# Patient Record
Sex: MALE | Race: WHITE | Employment: OTHER | URBAN - METROPOLITAN AREA
[De-identification: names, ages, dates, MRNs, and addresses within clinical notes are randomized per-mention and may not be internally consistent; named-entity substitution may affect disease eponyms.]

---

## 2018-12-18 ENCOUNTER — TELEPHONE (OUTPATIENT)
Dept: CARDIOLOGY CLINIC | Age: 68
End: 2018-12-18

## 2018-12-18 NOTE — TELEPHONE ENCOUNTER
Faxed records request to Dr. Lorena Singh office.     Future Appointments   Date Time Provider Maggy Kelly   12/20/2018  1:40 PM Lennox Arboleda  [address]

## 2018-12-20 ENCOUNTER — OFFICE VISIT (OUTPATIENT)
Dept: CARDIOLOGY CLINIC | Age: 68
End: 2018-12-20

## 2018-12-20 VITALS
OXYGEN SATURATION: 98 % | HEIGHT: 72 IN | RESPIRATION RATE: 16 BRPM | BODY MASS INDEX: 32.23 KG/M2 | HEART RATE: 79 BPM | DIASTOLIC BLOOD PRESSURE: 80 MMHG | SYSTOLIC BLOOD PRESSURE: 120 MMHG | WEIGHT: 238 LBS

## 2018-12-20 DIAGNOSIS — I10 ESSENTIAL HYPERTENSION: ICD-10-CM

## 2018-12-20 DIAGNOSIS — E78.2 MIXED HYPERLIPIDEMIA: ICD-10-CM

## 2018-12-20 DIAGNOSIS — R06.02 SOB (SHORTNESS OF BREATH): ICD-10-CM

## 2018-12-20 DIAGNOSIS — I25.10 CORONARY ARTERY DISEASE INVOLVING NATIVE CORONARY ARTERY OF NATIVE HEART WITHOUT ANGINA PECTORIS: Primary | ICD-10-CM

## 2018-12-20 PROBLEM — R94.31 ABNORMAL EKG: Status: ACTIVE | Noted: 2018-12-20

## 2018-12-20 RX ORDER — LISINOPRIL 10 MG/1
10 TABLET ORAL DAILY
COMMUNITY
Start: 2018-09-26 | End: 2019-01-10

## 2018-12-20 RX ORDER — ATORVASTATIN CALCIUM 20 MG/1
40 TABLET, FILM COATED ORAL
COMMUNITY
Start: 2018-12-19

## 2018-12-20 RX ORDER — METOPROLOL TARTRATE 25 MG/1
25 TABLET, FILM COATED ORAL 2 TIMES DAILY
COMMUNITY
Start: 2018-11-25 | End: 2019-01-25

## 2018-12-20 RX ORDER — PIOGLITAZONEHYDROCHLORIDE 30 MG/1
30 TABLET ORAL DAILY
COMMUNITY
End: 2019-12-09

## 2018-12-20 RX ORDER — GLIPIZIDE 10 MG/1
10 TABLET ORAL 2 TIMES DAILY
COMMUNITY
Start: 2018-11-26

## 2018-12-20 RX ORDER — METFORMIN HYDROCHLORIDE 500 MG/1
1000 TABLET ORAL 2 TIMES DAILY WITH MEALS
COMMUNITY
Start: 2018-12-17

## 2018-12-20 NOTE — PROGRESS NOTES
HISTORY OF PRESENT ILLNESS  Stewart Armstrong is a 79 y.o. male     SUMMARY:   Problem List  Date Reviewed: 12/20/2018          Codes Class Noted    Coronary artery disease involving native coronary artery without angina pectoris ICD-10-CM: I25.10  ICD-9-CM: 414.01  12/20/2018    Overview Signed 12/20/2018  4:32 PM by Solomon Schilder, MD     2010 mcv, small heart attack with 4 stents to rca, lad  2013 mcv, abnormal stress test, stent to diagonal             Abnormal EKG ICD-10-CM: R94.31  ICD-9-CM: 794.31  12/20/2018        Essential hypertension ICD-10-CM: I10  ICD-9-CM: 401.9  12/20/2018        Mixed hyperlipidemia ICD-10-CM: E78.2  ICD-9-CM: 272.2  12/20/2018              Current Outpatient Medications on File Prior to Visit   Medication Sig    atorvastatin (LIPITOR) 20 mg tablet 20 mg daily.  glipiZIDE (GLUCOTROL) 10 mg tablet Take 10 mg by mouth two (2) times a day.  lisinopril (PRINIVIL, ZESTRIL) 10 mg tablet Take 10 mg by mouth daily.  metFORMIN (GLUCOPHAGE) 500 mg tablet Take 1,000 mg by mouth two (2) times daily (with meals).  metoprolol tartrate (LOPRESSOR) 25 mg tablet Take 25 mg by mouth two (2) times a day.  pioglitazone (ACTOS) 30 mg tablet Take 30 mg by mouth daily. No current facility-administered medications on file prior to visit. CARDIOLOGY STUDIES TO DATE:  unknown  Chief Complaint   Patient presents with    Cholesterol Problem     HPI :  Mr. Edvin Gordon is a 79year-old with known coronary artery disease, who is self-referred for ongoing cardiac care. He used to see Dr. Brayan Mcgarry at [facility]. He had a small heart attack in 2010 with four stents and then an abnormal stress test in 2013 with another stent. He has longstanding hypertension and type 2 diabetes. He quit smoking in 2010. He has hyperlipidemia and his father had a fatal MI at age 64. We got some notes from Dr. Kellen Crocker' office, which I have reviewed and summarized in his chart. His recent A1C was 7.2.   HDL was 28 and the LDL was 69. He is active, but gets no regular exercise and has noticed some dyspnea on exertion over the last four to six months. His EKG today shows sinus rhythm with an intraventricular conduction delay and left ventricular hypertrophy by volts with left axis deviation. CARDIAC ROS:   negative for chest pain, palpitations, syncope, orthopnea, paroxysmal nocturnal dyspnea, exertional chest pressure/discomfort, claudication, lower extremity edema    Family History   Problem Relation Age of Onset    Heart Attack Father         fatal, age 64       Past Medical History:   Diagnosis Date    Type 2 diabetes mellitus ([facility] [address]        GENERAL ROS:  A comprehensive review of systems was negative except for that written in the HPI. Visit Vitals  /80 (BP 1 Location: Left arm, BP Patient Position: Sitting)   Pulse 79   Resp 16   Ht 6' (1.829 m)   Wt 238 lb (108 kg)   SpO2 98%   BMI 32.28 kg/m²       Wt Readings from Last 3 Encounters:   12/20/18 238 lb (108 kg)            BP Readings from Last 3 Encounters:   12/20/18 120/80       PHYSICAL EXAM  General appearance: alert, cooperative, no distress, appears stated age  Neurologic: Alert and oriented X 3  Neck: supple, symmetrical, trachea midline, no adenopathy, no carotid bruit and no JVD  Lungs: clear to auscultation bilaterally  Heart: regular rate and rhythm, S1, S2 normal, no murmur, click, rub or gallop  Abdomen: soft, non-tender.  Bowel sounds normal. No masses,  no organomegaly  Extremities: extremities normal, atraumatic, no cyanosis or edema  Pulses: 2+ and symmetric      ASSESSMENT  Mr. Krystle Quiros has multiple ongoing risk factors and some relatively recent symptoms, which could just be from deconditioning, but given his history, I think he needs a stress echocardiogram and he will hold his beta-blocker the night before and the morning of that exam.  We will obtain his old records from [facility] and we talked about the symptoms that would prompt an urgent return visit here or a call to 911. current treatment plan is effective, no change in therapy  lab results and schedule of future lab studies reviewed with patient  reviewed diet, exercise and weight control    Encounter Diagnoses   Name Primary?  Coronary artery disease involving native coronary artery of native heart without angina pectoris Yes    Essential hypertension     Mixed hyperlipidemia     SOB (shortness of breath)      Orders Placed This Encounter    AMB POC EKG ROUTINE W/ 12 LEADS, INTER & REP    ECHO TTE STRESS COMP W OR WO CONTR    atorvastatin (LIPITOR) 20 mg tablet    glipiZIDE (GLUCOTROL) 10 mg tablet    lisinopril (PRINIVIL, ZESTRIL) 10 mg tablet    metFORMIN (GLUCOPHAGE) 500 mg tablet    metoprolol tartrate (LOPRESSOR) 25 mg tablet    pioglitazone (ACTOS) 30 mg tablet       Follow-up Disposition:  Return in about 3 months (around 3/20/2019).     Rosa M Street MD  12/20/2018

## 2019-01-09 ENCOUNTER — CLINICAL SUPPORT (OUTPATIENT)
Dept: CARDIOLOGY CLINIC | Age: 69
End: 2019-01-09

## 2019-01-09 DIAGNOSIS — I10 ESSENTIAL HYPERTENSION: ICD-10-CM

## 2019-01-09 DIAGNOSIS — E78.2 MIXED HYPERLIPIDEMIA: ICD-10-CM

## 2019-01-09 DIAGNOSIS — I25.10 CORONARY ARTERY DISEASE INVOLVING NATIVE CORONARY ARTERY, ANGINA PRESENCE UNSPECIFIED, UNSPECIFIED WHETHER NATIVE OR TRANSPLANTED HEART: Primary | ICD-10-CM

## 2019-01-09 DIAGNOSIS — R94.31 ABNORMAL EKG: ICD-10-CM

## 2019-01-09 DIAGNOSIS — R06.02 SOB (SHORTNESS OF BREATH): ICD-10-CM

## 2019-01-09 DIAGNOSIS — I25.10 CORONARY ARTERY DISEASE INVOLVING NATIVE CORONARY ARTERY OF NATIVE HEART WITHOUT ANGINA PECTORIS: Primary | ICD-10-CM

## 2019-01-09 NOTE — PROGRESS NOTES
Pt came in for stress echo per Dr Brian De La Garza. Resting b/p by me 200/110. Repeat b/p by Abimbola Polanco /106. Dr. Brian De La Garza notified. Per Dr. Brian De La Garza stress echo cancelled and complete echo ordered. Per Dr. Brian De La Garza pt will resume his meds and come back for [facility] cardiolite.

## 2019-01-10 ENCOUNTER — TELEPHONE (OUTPATIENT)
Dept: CARDIOLOGY CLINIC | Age: 69
End: 2019-01-10

## 2019-01-10 DIAGNOSIS — I10 ESSENTIAL HYPERTENSION: Primary | ICD-10-CM

## 2019-01-10 RX ORDER — LISINOPRIL 20 MG/1
20 TABLET ORAL DAILY
Qty: 30 TAB | Refills: 0
Start: 2019-01-10 | End: 2019-01-25

## 2019-01-10 NOTE — TELEPHONE ENCOUNTER
----- Message from Franklyn Garsia MD sent at 1/10/2019  8:10 AM EST -----  Heart muscle is a little weak and shows evidence of prior heart attacks. Heart valves all ok. No surprise, this is what I would have expected.

## 2019-01-11 ENCOUNTER — TELEPHONE (OUTPATIENT)
Dept: CARDIOLOGY CLINIC | Age: 69
End: 2019-01-11

## 2019-01-11 NOTE — TELEPHONE ENCOUNTER
Called patient. Verified patient's identity with two identifiers. Patient stated his BP has been high still.  the past 2 days. This morning it was 180 before medications then a few hours after taking lisinopril 20 mg and lopressor 25 mg SBP down to 150. Patient denied having had any sxs. Patient will continue medications and will start keeping log of BPs. He will call the office if SBP > 170 on 2 or more occasions. Advised watching salt intake. Patient also requested f/u appointment with Dr. Erin Estrada to discuss stress test results and BP. Scheduled appointment for January 25, which is 2 days after 2nd day of stress test so it should have been read. Discussed signs and symptoms qualifying urgent care or call to office. Patient verbalized understanding and denied further questions or concerns. Notified Dr. Erin Estrada of all of this and he agreed. Called patient's daughter, Soniya Block, back and notified her Dr. Erin Estrada agreed to all of the above. She will notify patient.

## 2019-01-22 ENCOUNTER — CLINICAL SUPPORT (OUTPATIENT)
Dept: CARDIOLOGY CLINIC | Age: 69
End: 2019-01-22

## 2019-01-22 DIAGNOSIS — R06.02 SHORTNESS OF BREATH: ICD-10-CM

## 2019-01-22 DIAGNOSIS — R94.31 ABNORMAL EKG: ICD-10-CM

## 2019-01-22 DIAGNOSIS — E78.2 MIXED HYPERLIPIDEMIA: ICD-10-CM

## 2019-01-22 DIAGNOSIS — I25.10 CORONARY ARTERY DISEASE INVOLVING NATIVE CORONARY ARTERY OF NATIVE HEART WITHOUT ANGINA PECTORIS: Primary | ICD-10-CM

## 2019-01-22 DIAGNOSIS — I10 ESSENTIAL HYPERTENSION: ICD-10-CM

## 2019-01-22 DIAGNOSIS — Z98.61 HISTORY OF PTCA: ICD-10-CM

## 2019-01-22 NOTE — PROGRESS NOTES
See scanned document. Ordering Doctor:Dr. Chele Martinez  Reading Doctor:Dr. Caryle Chol    Patient tolerated procedure well.

## 2019-01-23 ENCOUNTER — CLINICAL SUPPORT (OUTPATIENT)
Dept: CARDIOLOGY CLINIC | Age: 69
End: 2019-01-23

## 2019-01-23 DIAGNOSIS — I25.10 CORONARY ARTERY DISEASE INVOLVING NATIVE CORONARY ARTERY OF NATIVE HEART WITHOUT ANGINA PECTORIS: Primary | ICD-10-CM

## 2019-01-23 DIAGNOSIS — E78.2 MIXED HYPERLIPIDEMIA: ICD-10-CM

## 2019-01-23 DIAGNOSIS — R94.31 ABNORMAL EKG: ICD-10-CM

## 2019-01-23 DIAGNOSIS — I10 ESSENTIAL HYPERTENSION: ICD-10-CM

## 2019-01-25 ENCOUNTER — OFFICE VISIT (OUTPATIENT)
Dept: CARDIOLOGY CLINIC | Age: 69
End: 2019-01-25

## 2019-01-25 VITALS
BODY MASS INDEX: 32.78 KG/M2 | SYSTOLIC BLOOD PRESSURE: 162 MMHG | HEIGHT: 72 IN | HEART RATE: 71 BPM | OXYGEN SATURATION: 98 % | DIASTOLIC BLOOD PRESSURE: 92 MMHG | WEIGHT: 242 LBS

## 2019-01-25 DIAGNOSIS — R94.31 ABNORMAL EKG: ICD-10-CM

## 2019-01-25 DIAGNOSIS — I10 ESSENTIAL HYPERTENSION: ICD-10-CM

## 2019-01-25 DIAGNOSIS — E78.2 MIXED HYPERLIPIDEMIA: ICD-10-CM

## 2019-01-25 DIAGNOSIS — I25.10 CORONARY ARTERY DISEASE INVOLVING NATIVE CORONARY ARTERY OF NATIVE HEART WITHOUT ANGINA PECTORIS: Primary | ICD-10-CM

## 2019-01-25 DIAGNOSIS — I25.5 ISCHEMIC CARDIOMYOPATHY: ICD-10-CM

## 2019-01-25 RX ORDER — LISINOPRIL 40 MG/1
40 TABLET ORAL DAILY
Qty: 30 TAB | Refills: 5 | Status: SHIPPED | OUTPATIENT
Start: 2019-01-25 | End: 2019-05-24 | Stop reason: SDUPTHER

## 2019-01-25 RX ORDER — GUAIFENESIN 100 MG/5ML
81 LIQUID (ML) ORAL DAILY
COMMUNITY
End: 2019-03-27

## 2019-01-25 RX ORDER — METOPROLOL SUCCINATE 50 MG/1
50 TABLET, EXTENDED RELEASE ORAL DAILY
Qty: 30 TAB | Refills: 5 | Status: SHIPPED | OUTPATIENT
Start: 2019-01-25 | End: 2019-08-27 | Stop reason: SDUPTHER

## 2019-01-25 NOTE — PROGRESS NOTES
HISTORY OF PRESENT ILLNESS  Preston Jesus is a 79 y.o. male     SUMMARY:   Problem List  Date Reviewed: 1/25/2019          Codes Class Noted    Coronary artery disease involving native coronary artery without angina pectoris ICD-10-CM: I25.10  ICD-9-CM: 414.01  12/20/2018    Overview Signed 12/20/2018  4:32 PM by Norberto Evans MD     2010 mcv, small heart attack with 4 stents to rca, lad  2013 mcv, abnormal stress test, stent to diagonal             Abnormal EKG ICD-10-CM: R94.31  ICD-9-CM: 794.31  12/20/2018        Essential hypertension ICD-10-CM: I10  ICD-9-CM: 401.9  12/20/2018        Mixed hyperlipidemia ICD-10-CM: E78.2  ICD-9-CM: 272.2  12/20/2018              Current Outpatient Medications on File Prior to Visit   Medication Sig    aspirin 81 mg chewable tablet Take 81 mg by mouth daily.  omega 3-dha-epa-fish oil (FISH OIL) 100-160-1,000 mg cap Take  by mouth.  lisinopril (PRINIVIL, ZESTRIL) 20 mg tablet Take 1 Tab by mouth daily. 1/10/19- increased from 10 mg to 20 mg per Dr. Ana Duffy verbal order    atorvastatin (LIPITOR) 20 mg tablet 20 mg daily.  glipiZIDE (GLUCOTROL) 10 mg tablet Take 10 mg by mouth two (2) times a day.  metFORMIN (GLUCOPHAGE) 500 mg tablet Take 1,000 mg by mouth two (2) times daily (with meals).  metoprolol tartrate (LOPRESSOR) 25 mg tablet Take 25 mg by mouth two (2) times a day.  pioglitazone (ACTOS) 30 mg tablet Take 30 mg by mouth daily. No current facility-administered medications on file prior to visit.         CARDIOLOGY STUDIES TO DATE:  2010 mcv, small heart attack with 4 stents to rca, lad  2013 mcv, abnormal stress test, stent to diagonal  1/19 echo lvef 43%  1/19 lexiscan cardiolyte with large fixed inferolateral defect, lvef 44%    Chief Complaint   Patient presents with    Coronary Artery Disease     HPI :  Mr. Serina Rai blood pressure is better, but still not ideal.  He remains very active with no symptoms suggestive of angina and we reviewed his recent reports and test results, which are consistent with mildly reduced ejection fraction and an old inferior MI. He still has dyspnea on exertion. CARDIAC ROS:   negative for chest pain, palpitations, syncope, orthopnea, paroxysmal nocturnal dyspnea, exertional chest pressure/discomfort, claudication, lower extremity edema    Family History   Problem Relation Age of Onset    Heart Attack Father         fatal, age 64       Past Medical History:   Diagnosis Date    Type 2 diabetes mellitus ([facility] [address]        GENERAL ROS:  A comprehensive review of systems was negative except for that written in the HPI. Visit Vitals  BP (!) 162/92   Pulse 71   Ht 6' (1.829 m)   Wt 242 lb (109.8 kg)   SpO2 98%   BMI 32.82 kg/m²       Wt Readings from Last 3 Encounters:   01/25/19 242 lb (109.8 kg)   12/20/18 238 lb (108 kg)            BP Readings from Last 3 Encounters:   01/25/19 (!) 162/92   12/20/18 120/80       PHYSICAL EXAM  General appearance: alert, cooperative, no distress, appears stated age  Neurologic: Alert and oriented X 3  Neck: supple, symmetrical, trachea midline, no adenopathy, no carotid bruit and no JVD  Lungs: clear to auscultation bilaterally  Heart: regular rate and rhythm, S1, S2 normal, no murmur, click, rub or gallop  Extremities: extremities normal, atraumatic, no cyanosis or edema      ASSESSMENT  Mr. Maryann Garay is stable and I think asymptomatic with fairly reassuring test results. I do think he needs better blood pressure control and perhaps that is part of his issues in terms of shortness of breath, so we are going to increase his Lisinopril to 40 mg a day and change the Metoprolol to Toprol XL 50 mg a day. If this is ineffective, we are either going to add a diuretic or a calcium channel blocker like Amlodipine.            current treatment plan is effective, no change in therapy  lab results and schedule of future lab studies reviewed with patient  reviewed diet, exercise and weight control    Encounter Diagnoses   Name Primary?  Coronary artery disease involving native coronary artery of native heart without angina pectoris Yes    Abnormal EKG     Essential hypertension     Mixed hyperlipidemia     Ischemic cardiomyopathy      Orders Placed This Encounter    aspirin 81 mg chewable tablet    omega 3-dha-epa-fish oil (FISH OIL) 100-160-1,000 mg cap       Follow-up Disposition:  Return in about 4 weeks (around 2/22/2019).     Bria Kebede MD  1/25/2019

## 2019-01-25 NOTE — PATIENT INSTRUCTIONS
Stop your current metoprolol (2 times a day) and replace with Toprol XL 50 mg daily. Increase your lisinopril from 20 mg to 40 mg daily.

## 2019-02-25 ENCOUNTER — OFFICE VISIT (OUTPATIENT)
Dept: CARDIOLOGY CLINIC | Age: 69
End: 2019-02-25

## 2019-02-25 VITALS
DIASTOLIC BLOOD PRESSURE: 80 MMHG | SYSTOLIC BLOOD PRESSURE: 124 MMHG | HEART RATE: 62 BPM | WEIGHT: 240.4 LBS | RESPIRATION RATE: 19 BRPM | OXYGEN SATURATION: 97 % | BODY MASS INDEX: 32.56 KG/M2 | HEIGHT: 72 IN

## 2019-02-25 DIAGNOSIS — I25.10 CORONARY ARTERY DISEASE INVOLVING NATIVE CORONARY ARTERY OF NATIVE HEART WITHOUT ANGINA PECTORIS: Primary | ICD-10-CM

## 2019-02-25 DIAGNOSIS — I10 ESSENTIAL HYPERTENSION: ICD-10-CM

## 2019-02-25 DIAGNOSIS — E78.2 MIXED HYPERLIPIDEMIA: ICD-10-CM

## 2019-02-25 NOTE — PROGRESS NOTES
HISTORY OF PRESENT ILLNESS Pawan Logan is a 79 y.o. male SUMMARY:  
Problem List  Date Reviewed: 2/25/2019 Codes Class Noted Coronary artery disease involving native coronary artery without angina pectoris ICD-10-CM: I25.10 ICD-9-CM: 414.01  12/20/2018 Overview Signed 12/20/2018  4:32 PM by Connie Garnett MD  
  2010 mcv, small heart attack with 4 stents to rca, lad 2013 mcv, abnormal stress test, stent to diagonal 
  
  
   
 Abnormal EKG ICD-10-CM: R94.31 
ICD-9-CM: 794.31  12/20/2018 Essential hypertension ICD-10-CM: I10 
ICD-9-CM: 401.9  12/20/2018 Mixed hyperlipidemia ICD-10-CM: E78.2 ICD-9-CM: 272.2  12/20/2018 Current Outpatient Medications on File Prior to Visit Medication Sig  
 aspirin 81 mg chewable tablet Take 81 mg by mouth daily.  omega 3-dha-epa-fish oil (FISH OIL) 100-160-1,000 mg cap Take  by mouth.  lisinopril (PRINIVIL, ZESTRIL) 40 mg tablet Take 1 Tab by mouth daily.  metoprolol succinate (TOPROL-XL) 50 mg XL tablet Take 1 Tab by mouth daily.  atorvastatin (LIPITOR) 20 mg tablet 20 mg daily.  glipiZIDE (GLUCOTROL) 10 mg tablet Take 10 mg by mouth two (2) times a day.  metFORMIN (GLUCOPHAGE) 500 mg tablet Take 1,000 mg by mouth two (2) times daily (with meals).  pioglitazone (ACTOS) 30 mg tablet Take 30 mg by mouth daily. No current facility-administered medications on file prior to visit. CARDIOLOGY STUDIES TO DATE: 
2010 mcv, small heart attack with 4 stents to rca, lad 2013 mcv, abnormal stress test, stent to diagonal 
1/19 echo lvef 43% 1/19 lexiscan cardiolyte with large fixed inferolateral defect, lvef 44% Chief Complaint Patient presents with  Coronary Artery Disease HPI : 
Mr. Ernie Michel is doing great.   Blood pressure has come under good control and he still remains very active around his house and home with no worrisome symptoms. Dr. Case Rodas is following his lipids and his diabetes. CARDIAC ROS:  
negative for chest pain, dyspnea, palpitations, syncope, orthopnea, paroxysmal nocturnal dyspnea, exertional chest pressure/discomfort, claudication, lower extremity edema Family History Problem Relation Age of Onset  Heart Attack Father   
     fatal, age 64 Past Medical History:  
Diagnosis Date  Type 2 diabetes mellitus ([facility] [address] GENERAL ROS: 
A comprehensive review of systems was negative except for that written in the HPI. Visit Vitals /80 (BP 1 Location: Left arm, BP Patient Position: Sitting) Pulse 62 Resp 19 Ht 6' (1.829 m) Wt 240 lb 6.4 oz (109 kg) SpO2 97% BMI 32.60 kg/m² Wt Readings from Last 3 Encounters:  
02/25/19 240 lb 6.4 oz (109 kg) 01/25/19 242 lb (109.8 kg) 12/20/18 238 lb (108 kg) BP Readings from Last 3 Encounters:  
02/25/19 124/80  
01/25/19 (!) 162/92  
12/20/18 120/80 PHYSICAL EXAM 
General appearance: alert, cooperative, no distress, appears stated age Neurologic: Alert and oriented X 3 Neck: supple, symmetrical, trachea midline, no adenopathy, no carotid bruit and no JVD Lungs: clear to auscultation bilaterally Heart: regular rate and rhythm, S1, S2 normal, no murmur, click, rub or gallop Extremities: extremities normal, atraumatic, no cyanosis or edema ASSESSMENT Mr. Zeinab Joseph is stable and asymptomatic and well compensated on a good medical regimen and needs no cardiac testing at this time. current treatment plan is effective, no change in therapy 
lab results and schedule of future lab studies reviewed with patient 
reviewed diet, exercise and weight control Encounter Diagnoses Name Primary?  Coronary artery disease involving native coronary artery of native heart without angina pectoris Yes  Essential hypertension  Mixed hyperlipidemia No orders of the defined types were placed in this encounter. Follow-up Disposition: 
Return in about 6 months (around 8/25/2019). Chikis Silva MD2/25/2019

## 2019-02-25 NOTE — PROGRESS NOTES
1. Have you been to the ER, urgent care clinic since your last visit? Hospitalized since your last visit? No 
 
2. Have you seen or consulted any other health care providers outside of the [address] [place] [address] since your last visit? Include any pap smears or colon screening. No 
 
3) Do you have an Advance Directive on file? no 
 
Patient is accompanied by self I have received verbal consent from Lee Hoover to discuss any/all medical information while they are present in the room.

## 2019-03-20 ENCOUNTER — TELEPHONE (OUTPATIENT)
Dept: CARDIOLOGY CLINIC | Age: 69
End: 2019-03-20

## 2019-03-20 NOTE — PERIOP NOTES
TCT Dr Ruperto Muñoz - cardiology rquesting baby asa instructions for up coming colonoscopy Stop Iris Gearing 6 sent to Mj Zuniga NP

## 2019-03-20 NOTE — TELEPHONE ENCOUNTER
Jose Carlos Olivarez called from Ambulatory Surgery. Patient's GI doctor would like clearance for patient to hold ASA 81 mg 5 days prior to colonoscopy. She said the clearance does not need to be faxed because she will be able to see note in [facility]. Please advise if okay.

## 2019-03-20 NOTE — PERIOP NOTES
[facility] Ambulatory Surgery Unit Pre-operative Instructions for Endo Procedures Procedure Date 03/27/19           Tentative Arrival Time 0800 1. On the day of your procedure, please report to the Ambulatory Surgery Unit Registration Desk and sign in at your designated time. The Ambulatory Surgery Unit is located in [facility] on the [facility] side of the [facility] across from the [facility]. Please have all of your health insurance cards and a photo ID. 2. You must have someone with you to drive you home, as you should not drive a car for 24 hours following anesthesia. Please make arrangements for a responsible adult friend or family member to stay with you for at least the first 24 hours after your procedure. 3. Do not have anything to eat or drink (including water, gum, mints, coffee, juice) after 11:59 PM 03/26/19. This may not apply to medications prescribed by your physician. (Please note below the special instructions with medications to take the morning of your procedure.) 4. If applicable, follow the clear liquid diet and bowel prep instructions provided by your physician's office. If you do not have this information, or have any questions, please contact your physician's office. 5. We recommend you do not drink any alcoholic beverages for 24 hours before and after your procedure. 6. Contact your surgeons office for instructions on the following medications: non-steroidal anti-inflammatory drugs (i.e. Advil, Aleve), vitamins, and supplements. (Some surgeons will want you to stop these medications prior to surgery and others may allow you to take them) **If you are currently taking Plavix, Coumadin, Aspirin and/or other blood-thinning agents, contact your surgeon for instructions. ** Your surgeon will partner with the physician prescribing these medications to determine if it is safe to stop or if you need to continue taking.  Please do not stop taking these medications without instructions from your surgeon. 7. In an effort to help prevent surgical site infection, we ask that you shower with an anti-bacterial soap (i.e. Dial or Safeguard) on the morning of your procedure. Do not apply any lotions, powders, or deodorants after showering. 8. Wear comfortable clothes. Wear glasses instead of contacts. Do not bring any jewelry or money (other than copays or fees as instructed). Do not wear make-up, particularly mascara, the morning of your procedure. Wear your hair loose or down, no ponytails, buns, candida pins or clips. All body piercings must be removed. 9. You should understand that if you do not follow these instructions your procedure may be cancelled. If your physical condition changes (i.e. fever, cold or flu) please contact your surgeon as soon as possible. 10. It is important that you be on time. If a situation occurs where you may be late, or if you have any questions or problems, please call [phone]. 11. Your procedure time may be subject to change. You will receive a phone call the day prior to confirm your arrival time. Special Instructions: Take all medications and inhalers, as prescribed, on the morning of surgery with a sip of water EXCEPT: Diabetic meds Insulin Dependent Diabetic patients: Take your diabetic medications as prescribed the day before surgery. Hold all diabetic medications the day of surgery. If you are scheduled to arrive for surgery after 8:00 AM, and your AM blood sugar is >200, please call Ambulatory Surgery. I understand a pre-operative phone call will be made to verify my procedure time. In the event that I am not available, I give permission for a message to be left on my answering service and/or with another person? yes 
 
 
 
 ___________________      ___________________      ___________________ (Signature of Patient)          (Witness)                   (Date and Time)

## 2019-03-26 ENCOUNTER — ANESTHESIA EVENT (OUTPATIENT)
Dept: SURGERY | Age: 69
End: 2019-03-26
Payer: MEDICARE

## 2019-03-26 NOTE — ANESTHESIA PREPROCEDURE EVALUATION
Relevant Problems No relevant active problems Anesthetic History No history of anesthetic complications Review of Systems / Medical History Patient summary reviewed, nursing notes reviewed and pertinent labs reviewed Pulmonary Smoker Comments: Former smoker Neuro/Psych Within defined limits Cardiovascular Hypertension Past MI, CAD and hyperlipidemia Exercise tolerance: >4 METS Comments: ECHO from January 2019 shows a 43% EF with mild MR  
GI/Hepatic/Renal 
Within defined limits Endo/Other Diabetes: type 2 Obesity Other Findings Physical Exam 
 
Airway Mallampati: III 
TM Distance: > 6 cm Neck ROM: normal range of motion Mouth opening: Normal 
 
 Cardiovascular Regular rate and rhythm,  S1 and S2 normal,  no murmur, click, rub, or gallop Dental 
 
Dentition: Caps/crowns Pulmonary Breath sounds clear to auscultation Abdominal 
GI exam deferred Other Findings Anesthetic Plan ASA: 3 Anesthesia type: general and total IV anesthesia Induction: Intravenous Anesthetic plan and risks discussed with: Patient

## 2019-03-27 ENCOUNTER — ANESTHESIA (OUTPATIENT)
Dept: SURGERY | Age: 69
End: 2019-03-27
Payer: MEDICARE

## 2019-03-27 ENCOUNTER — HOSPITAL ENCOUNTER (OUTPATIENT)
Age: 69
Setting detail: OUTPATIENT SURGERY
Discharge: HOME | End: 2019-03-27
Attending: COLON & RECTAL SURGERY | Admitting: COLON & RECTAL SURGERY
Payer: MEDICARE

## 2019-03-27 VITALS
DIASTOLIC BLOOD PRESSURE: 84 MMHG | WEIGHT: 231 LBS | HEART RATE: 63 BPM | RESPIRATION RATE: 17 BRPM | OXYGEN SATURATION: 96 % | BODY MASS INDEX: 31.29 KG/M2 | HEIGHT: 72 IN | TEMPERATURE: 97.9 F | SYSTOLIC BLOOD PRESSURE: 156 MMHG

## 2019-03-27 PROBLEM — Z12.11 ENCOUNTER FOR SCREENING COLONOSCOPY: Status: ACTIVE | Noted: 2019-03-27

## 2019-03-27 LAB
GLUCOSE BLD STRIP.AUTO-MCNC: 146 MG/DL (ref 65–100)
GLUCOSE BLD STRIP.AUTO-MCNC: 170 MG/DL (ref 65–100)
SERVICE CMNT-IMP: ABNORMAL
SERVICE CMNT-IMP: ABNORMAL

## 2019-03-27 PROCEDURE — 82962 GLUCOSE BLOOD TEST: CPT

## 2019-03-27 PROCEDURE — 76210000040 HC AMBSU PH I REC FIRST 0.5 HR: Performed by: COLON & RECTAL SURGERY

## 2019-03-27 PROCEDURE — 76030000000 HC AMB SURG OR TIME 0.5 TO 1: Performed by: COLON & RECTAL SURGERY

## 2019-03-27 PROCEDURE — 76060000061 HC AMB SURG ANES 0.5 TO 1 HR: Performed by: COLON & RECTAL SURGERY

## 2019-03-27 PROCEDURE — 88305 TISSUE EXAM BY PATHOLOGIST: CPT

## 2019-03-27 PROCEDURE — 74011250636 HC RX REV CODE- 250/636

## 2019-03-27 PROCEDURE — 74011250636 HC RX REV CODE- 250/636: Performed by: ANESTHESIOLOGY

## 2019-03-27 PROCEDURE — 77030021352 HC CBL LD SYS DISP COVD -B: Performed by: COLON & RECTAL SURGERY

## 2019-03-27 PROCEDURE — 77030009426 HC FCPS BIOP ENDOSC BSC -B: Performed by: COLON & RECTAL SURGERY

## 2019-03-27 PROCEDURE — 77030013992 HC SNR POLYP ENDOSC BSC -B: Performed by: COLON & RECTAL SURGERY

## 2019-03-27 PROCEDURE — 77030020255 HC SOL INJ LR 1000ML BG: Performed by: COLON & RECTAL SURGERY

## 2019-03-27 PROCEDURE — 76210000050 HC AMBSU PH II REC 0.5 TO 1 HR: Performed by: COLON & RECTAL SURGERY

## 2019-03-27 RX ORDER — SODIUM CHLORIDE 0.9 % (FLUSH) 0.9 %
5-40 SYRINGE (ML) INJECTION AS NEEDED
Status: DISCONTINUED | OUTPATIENT
Start: 2019-03-27 | End: 2019-03-27 | Stop reason: HOSPADM

## 2019-03-27 RX ORDER — PROPOFOL 10 MG/ML
INJECTION, EMULSION INTRAVENOUS AS NEEDED
Status: DISCONTINUED | OUTPATIENT
Start: 2019-03-27 | End: 2019-03-27 | Stop reason: HOSPADM

## 2019-03-27 RX ORDER — SODIUM CHLORIDE 0.9 % (FLUSH) 0.9 %
5-40 SYRINGE (ML) INJECTION EVERY 8 HOURS
Status: DISCONTINUED | OUTPATIENT
Start: 2019-03-27 | End: 2019-03-27 | Stop reason: HOSPADM

## 2019-03-27 RX ORDER — SODIUM CHLORIDE, SODIUM LACTATE, POTASSIUM CHLORIDE, CALCIUM CHLORIDE 600; 310; 30; 20 MG/100ML; MG/100ML; MG/100ML; MG/100ML
25 INJECTION, SOLUTION INTRAVENOUS CONTINUOUS
Status: DISCONTINUED | OUTPATIENT
Start: 2019-03-27 | End: 2019-03-27 | Stop reason: HOSPADM

## 2019-03-27 RX ORDER — FENTANYL CITRATE 50 UG/ML
25 INJECTION, SOLUTION INTRAMUSCULAR; INTRAVENOUS
Status: DISCONTINUED | OUTPATIENT
Start: 2019-03-27 | End: 2019-03-27 | Stop reason: HOSPADM

## 2019-03-27 RX ORDER — DIPHENHYDRAMINE HYDROCHLORIDE 50 MG/ML
12.5 INJECTION, SOLUTION INTRAMUSCULAR; INTRAVENOUS
Status: DISCONTINUED | OUTPATIENT
Start: 2019-03-27 | End: 2019-03-27 | Stop reason: HOSPADM

## 2019-03-27 RX ORDER — MORPHINE SULFATE 10 MG/ML
2 INJECTION, SOLUTION INTRAMUSCULAR; INTRAVENOUS
Status: DISCONTINUED | OUTPATIENT
Start: 2019-03-27 | End: 2019-03-27 | Stop reason: HOSPADM

## 2019-03-27 RX ORDER — ONDANSETRON 2 MG/ML
4 INJECTION INTRAMUSCULAR; INTRAVENOUS AS NEEDED
Status: DISCONTINUED | OUTPATIENT
Start: 2019-03-27 | End: 2019-03-27 | Stop reason: HOSPADM

## 2019-03-27 RX ORDER — LIDOCAINE HYDROCHLORIDE 10 MG/ML
0.1 INJECTION, SOLUTION EPIDURAL; INFILTRATION; INTRACAUDAL; PERINEURAL AS NEEDED
Status: DISCONTINUED | OUTPATIENT
Start: 2019-03-27 | End: 2019-03-27 | Stop reason: HOSPADM

## 2019-03-27 RX ADMIN — SODIUM CHLORIDE, SODIUM LACTATE, POTASSIUM CHLORIDE, AND CALCIUM CHLORIDE 25 ML/HR: 600; 310; 30; 20 INJECTION, SOLUTION INTRAVENOUS at 08:46

## 2019-03-27 RX ADMIN — PROPOFOL 320 MG: 10 INJECTION, EMULSION INTRAVENOUS at 10:25

## 2019-03-27 NOTE — INTERVAL H&P NOTE
H&P Update: 
Ceci Flores was seen and examined. History and physical has been reviewed. The patient has been examined.  There have been no significant clinical changes since the completion of the originally dated History and Physical. 
 
Signed By: Jackie Coffey MD   
 March 27, 2019 9:43 AM

## 2019-03-27 NOTE — ANESTHESIA POSTPROCEDURE EVALUATION
Procedure(s): 
COLONOSCOPY 
ENDOSCOPIC POLYPECTOMY 
COLON BIOPSY. general, total IV anesthesia Anesthesia Post Evaluation Patient location during evaluation: PACU Note status: Adequate. Level of consciousness: responsive to verbal stimuli and sleepy but conscious Pain management: satisfactory to patient Airway patency: patent Anesthetic complications: no 
Cardiovascular status: acceptable Respiratory status: acceptable Hydration status: acceptable Comments: +Post-Anesthesia Evaluation and Assessment Patient: Markus Meza MRN: [id number]  SSN: [id number] YOB: 1950  Age: 79 y.o. Sex: male Cardiovascular Function/Vital Signs /71   Pulse 70   Temp 36.6 °C (97.9 °F)   Resp 12   Ht 6' (1.829 m)   Wt 104.8 kg (231 lb)   SpO2 95%   BMI 31.33 kg/m² Patient is status post Procedure(s): 
COLONOSCOPY 
ENDOSCOPIC POLYPECTOMY 
COLON BIOPSY. Nausea/Vomiting: Controlled. Postoperative hydration reviewed and adequate. Pain: 
Pain Scale 1: Numeric (0 - 10) (03/27/19 0844) Pain Intensity 1: 0 (03/27/19 0844) Managed. Neurological Status:  
Neuro (WDL): Within Defined Limits (03/27/19 0842) At baseline. Mental Status and Level of Consciousness: Arousable. Pulmonary Status:  
O2 Device: Room air (03/27/19 1030) Adequate oxygenation and airway patent. Complications related to anesthesia: None Post-anesthesia assessment completed. No concerns. Signed By: Marbella Wells MD  
 3/27/2019 Post anesthesia nausea and vomiting:  controlled Vitals Value Taken Time /71 3/27/2019 10:30 AM  
Temp 36.6 °C (97.9 °F) 3/27/2019 10:30 AM  
Pulse 70 3/27/2019 10:30 AM  
Resp 12 3/27/2019 10:30 AM  
SpO2 95 % 3/27/2019 10:30 AM

## 2019-03-27 NOTE — PERIOP NOTES
Sakshi Nolasco 1950 
[id number] Situation: 
Verbal report given from: Russell Vaz CRNA Procedure: Procedure(s): 
COLONOSCOPY 
ENDOSCOPIC POLYPECTOMY 
COLON BIOPSY Background: 
 
Preoperative diagnosis: SCREENING, FAMILY HISTORY OF POLYPS Postoperative diagnosis: TRANSVERSE/ASCENDING/ 
DESCENDING/APPENDICEAL ORFICE/DISTAL SIGMOID COLON POLYPS, LEFT DIVERTICULOSIS :  Dr. Zo Krishnan Assistant(s): Circ-1: Michele Aponte RN Scrub Tech-1: [facility], [address] Tech-2: [facility] Specimens:  
ID Type Source Tests Collected by Time Destination 1 : DESCENDING COLON POLYP Preservative Colon, Descending  Ernie Avalos MD 3/27/2019 8877 Pathology 2 : ASCENDING COLON  POLYP Preservative Colon, Ascending  Ernie Avalos MD 3/27/2019 1004 Pathology 3 : APPENDICEAL ORFICE POLYP BIOPSY Preservative Appendiceal Orfice  Ernie Avalos MD 3/27/2019 1011 Pathology 4 : TRANSVERSE COLON POLYP Preservative Colon, Transverse  Ernie Avalos MD 3/27/2019 1018 Pathology 5 : DISTAL SIGMOID COLON POLYP Preservative Sigmoid  Ernie Avalos MD 3/27/2019 1023 Pathology Assessment: 
Intra-procedure medications Propofol 320 mg Anesthesia gave intra-procedure sedation and medications, see anesthesia flow sheet Intravenous fluids: Zhou Mc Vital signs stable Abdominal assessment: round and soft Recommendation: 
 
Permission to share finding with wife Agata Morales yes All side rails up, bed in low position, wheels locked. Nurse at bedside.

## 2019-03-27 NOTE — BRIEF OP NOTE
BRIEF OPERATIVE NOTE Date of Procedure: 3/27/2019 Preoperative Diagnosis: SCREENING, FAMILY HISTORY OF POLYPS Postoperative Diagnosis: TRANSVERSE/ASCENDING/ 
DESCENDING/APPENDICEAL ORFICE/DISTAL SIGMOID COLON POLYPS, LEFT DIVERTICULOSIS Procedure(s): 
COLONOSCOPY 
ENDOSCOPIC POLYPECTOMY 
COLON BIOPSY Surgeon(s) and Role: Jaxson Tracy MD - Primary Surgical Assistant: None Surgical Staff: 
Circ-1: Jamey Palencia RN Scrub Tech-1: Brenna Simon, [address] Tech-2: Jeff Black Event Time In Time Out Incision Start 2189 Incision Close 1025 Anesthesia: MAC Estimated Blood Loss: None Specimens:  
ID Type Source Tests Collected by Time Destination 1 : DESCENDING COLON POLYP Preservative Colon, Descending  Jaxson Tracy MD 3/27/2019 1103 Pathology 2 : ASCENDING COLON  POLYP Preservative Colon, Ascending  Jaxson Tracy MD 3/27/2019 1004 Pathology 3 : APPENDICEAL ORFICE POLYP BIOPSY Preservative Appendiceal Orfice  Jaxson Tracy MD 3/27/2019 1011 Pathology 4 : TRANSVERSE COLON POLYP Preservative Colon, Transverse  Jaxson Tracy MD 3/27/2019 1018 Pathology 5 : DISTAL SIGMOID COLON POLYP Preservative Sigmoid  Jaxson Tracy MD 3/27/2019 1023 Pathology Findings: periappendiceal orifice polyp not endoscopically removeable;  Sigmoid polyp snared, Ileocecal polyp biopsy/fulgurated;  Cecal polyp 1 to 2 cm away from periappendiceal polyp left alone, as periappendiceal lesion will require resection of the area. ;  Few diverticuli in sigmoid Complications: none Implants: * No implants in log *

## 2019-03-27 NOTE — H&P
[facility] HISTORY AND PHYSICAL Name:  Diamond Peralta 
MR#:  [id number] :  1950 ACCOUNT #:  [de-identified] ADMIT DATE:  2019 CHIEF COMPLAINT:  For screening colonoscopy. HISTORY OF PRESENT ILLNESS:  The patient is in for evaluation for his first screening colonoscopy. He moves his bowels daily. He has no nausea or vomiting, fever or chills, or constipation or diarrhea. He has no weight loss or pain. He has no rectal bleeding. He does have a family history of colonic polyps in his mother. PAST MEDICAL HISTORY:  He has had 5 stents placed in his heart, diabetes, hypertension, and hyperlipidemia. ALLERGIES:  NONE. CURRENT MEDICATIONS:  Metformin, lisinopril, metoprolol, glipizide, aspirin, fish oil, and atorvastatin. SOCIAL HISTORY:  He drinks socially. He stopped smoking in . FAMILY HISTORY:  Significant for polyps in his mother, hypertension, heart disease. REVIEW OF SYSTEMS:  He has occasional shortness of breath. Otherwise, his 10 system review is negative. PHYSICAL EXAMINATION: 
GENERAL:  Reveals a 17-year-old white male in no acute distress. VITAL SIGNS:  He is 6 feet tall, weighs 240 pounds. He has a blood pressure of 140/86. HEENT:  Clear. NECK:  Supple. LUNGS:  Clear with an occasional expiratory wheeze. HEART:  Regular without murmur or failure. ABDOMEN:  Soft and benign. There is no mass or organomegaly. RECTAL:  Pending c-scope. EXTREMITIES:  Intact. NEUROLOGIC:  Intact. ASSESSMENT:  A 17-year-old white male with positive family history of colonic polyps, in for his first screening exam.  He is aware of the risks of the procedure including bleeding, perforation,and the risks of missing small polyps and he is agreeable to proceed. Trevon Levy MD 
 
 
[id number]/ 
D:  2019 21:56 
T:  2019 22:17 JOB #:  [id number] CC:   Yissel Burton MD

## 2019-03-27 NOTE — PERIOP NOTES
Patient: Gracie Partida MRN: [id number]  SSN: [id number] YOB: 1950  Age: 79 y.o. Sex: male Patient is status post Procedure(s): 
COLONOSCOPY 
ENDOSCOPIC POLYPECTOMY 
COLON BIOPSY. Surgeon(s) and Role: Shelby Devine MD - Primary Peripheral IV 03/27/19 Right Hand (Active) Site Assessment Clean, dry, & intact 3/27/2019  8:44 AM  
Phlebitis Assessment 0 3/27/2019  8:44 AM  
Infiltration Assessment 0 3/27/2019  8:44 AM  
Dressing Status Clean, dry, & intact 3/27/2019  8:44 AM  
Dressing Type Tape;Transparent 3/27/2019  8:44 AM  
Hub Color/Line Status Blue; Infusing 3/27/2019  8:44 AM  
                 
 
 
 
Dressing/Packing:   Jazz Chin Other:  Endoscope was pre-cleaned at bedside immediately by [facility]

## 2019-03-27 NOTE — DISCHARGE INSTRUCTIONS
Nimesh Fair  [id number]  1950    COLON DISCHARGE INSTRUCTIONS  Discomfort:  Redness at IV site- apply warm compress to area; if redness or soreness persist- contact your physician  There may be a slight amount of blood passed from the rectum  Gaseous discomfort- walking, belching will help relieve any discomfort  You may not operate a vehicle for 24 hours  You may not engage in an occupation involving machinery or appliances for rest of today  You may not drink alcoholic beverages for at least 24 hours  Avoid making any critical decisions for at least 24 hour  DIET:   Regular diet. - however -  remember your colon is empty and a heavy meal will produce gas. Avoid these foods:  vegetables, fried / greasy foods, carbonated drinks for today     ACTIVITY:  You may not  resume your normal daily activities it is recommended that you spend the remainder of the day resting -  avoid any strenuous activity. CALL M.D. ANY SIGN OF:   Increasing pain, nausea, vomiting  Abdominal distension (swelling)  New increased bleeding (oral or rectal)  Fever (chills)  Pain in chest area  Bloody discharge from nose or mouth  Shortness of breath    You may not  take any Advil, Aspirin, Ibuprofen, Motrin, Aleve, or Goodys for 10 days, ONLY  Tylenol as needed for pain. Post procedure diagnosis:  TRANSVERSE/ASCENDING/  DESCENDING/APPENDICEAL ORFICE/DISTAL SIGMOID COLON POLYPS, LEFT DIVERTICULOSIS  Follow-up Instructions:   Call Dr Rogerio Rock if questions  Telephone #  [phone]  Additional instructions ;   Return to office in 7 to 10 days to discuss options                  Nimesh Fair  [id number]  1950        DISCHARGE SUMMARY from Nurse    The following personal items collected during your admission are returned to you:   Dental Appliance: Dental Appliances: None  Vision: Visual Aid: Glasses, With patient, None  Hearing Aid:    Jewelry:    Clothing:    Other Valuables:    Valuables sent to safe:            DO NOT TAKE SLEEPING MEDICATIONS OR ANTIANXIETY MEDICATIONS WHILE TAKING NARCOTIC PAIN MEDICATIONS,  ESPECIALLY THE NIGHT OF ANESTHESIA. CPAP PATIENTS BE SURE TO WEAR MACHINE WHENEVER NAPPING OR SLEEPING. DISCHARGE SUMMARY from Nurse    The following personal items collected during your admission are returned to you:   Dental Appliance: Dental Appliances: None  Vision: Visual Aid: Glasses, With patient, None  Hearing Aid:    Jewelry:    Clothing:    Other Valuables:    Valuables sent to safe:        PATIENT INSTRUCTIONS:    After General Anesthesia or Intravenous Sedation, for 24 hours or while taking prescription Narcotics:        Someone should be with you for the next 24 hours. For your own safety, a responsible adult must drive you home. · Limit your activities  · Recommended activity: Rest today, up with assistance today. Do not climb stairs or shower unattended for the next 24 hours. · Please start with a soft bland diet and advance as tolerated (no nausea) to regular diet. · If you have a sore throat you should try the following: fluids, warm salt water gargles, or throat lozenges. If it does not improve after several days please follow up with your primary physician. · Do not drive and operate hazardous machinery  · Do not make important personal or business decisions  · Do  not drink alcoholic beverages  · If you have not urinated within 8 hours after discharge, please contact your surgeon on call. Report the following to your surgeon:  · Excessive pain, swelling, redness or odor of or around the surgical area  · Temperature over 100.5  · Nausea and vomiting lasting longer than 4 hours or if unable to take medications  · Any signs of decreased circulation or nerve impairment to extremity: change in color, persistent  numbness, tingling, coldness or increase pain      · You will receive a Post Operative Call from one of the Recovery Room Nurses on the day after your surgery to check on you.  It is very important for us to know how you are recovering after your surgery. If you have an issue or need to speak with someone, please call your surgeon, do not wait for the post operative call. · You may receive an e-mail or letter in the mail from [facility] regarding your experience with us in the Ambulatory Surgery Unit. Your feedback is valuable to us and we appreciate your participation in the survey. · If the above instructions are not adequate, please contact Dayana Borges RN, Anne anesthesia Nurse Manager or our Anesthesiologist, at [phone]. If you are having problems after your surgery, call the physician at his office number. · We wish you a speedy recovery ? What to do at Home:      *  Please give a list of your current medications to your Primary Care Provider. *  Please update this list whenever your medications are discontinued, doses are      changed, or new medications (including over-the-counter products) are added. *  Please carry medication information at all times in case of emergency situations. These are general instructions for a healthy lifestyle:    No smoking/ No tobacco products/ Avoid exposure to second hand smoke    Surgeon General's Warning:  Quitting smoking now greatly reduces serious risk to your health. Obesity, smoking, and sedentary lifestyle greatly increases your risk for illness    A healthy diet, regular physical exercise & weight monitoring are important for maintaining a healthy lifestyle    You may be retaining fluid if you have a history of heart failure or if you experience any of the following symptoms:  Weight gain of 3 pounds or more overnight or 5 pounds in a week, increased swelling in our hands or feet or shortness of breath while lying flat in bed. Please call your doctor as soon as you notice any of these symptoms; do not wait until your next office visit.     Recognize signs and symptoms of STROKE:    B - Balance  E - Eyes    F-  Face looks uneven    A-  Arms unable to move or move even    S-  Speech slurred or non-existent    T-  Time-call 911 as soon as signs and symptoms begin-DO NOT go       Back to bed or wait to see if you get better-TIME IS BRAIN. If you have not received your influenza and/or pneumococcal vaccine, please follow up with your primary care physician. The discharge information has been reviewed with the patient and caregiver. The patient and caregiver verbalized understanding.

## 2019-03-27 NOTE — PERIOP NOTES
1029-Pt. To pacu, sleepy but arousable. Vss. Denies pain. Abdomen soft, nontender. 1105-Pts. Wife at bedside. Reviewed discharge instructions w/pt. And wife. They verbalized understanding. 1124-Pt. And wife stated ready for discharge. Vss. Denies pain, abdomen soft, nontender. Pt. Discharged to car via wheelchair.

## 2019-03-27 NOTE — PERIOP NOTES
Permission received to review discharge instructions and discuss private health information with Mirta Cantrell (wife).

## 2019-03-28 NOTE — OP NOTES
[address]  OPERATIVE REPORT    Name:  Buddy Romberg  MR#:  [id number]  :  1950  ACCOUNT #:  [de-identified]  DATE OF SERVICE:  2019    PREOPERATIVE DIAGNOSIS:  For first baseline screening colonoscopy. POSTOPERATIVE DIAGNOSES:  1.  Scattered small diverticulum in the sigmoid colon. 2.  What appears to be a periappendiceal polyp, which is not safely endoscopically removable without risk for injury or perforation at the appendiceal cecal junction; this polyp is hot biopsied today for pathology. 3.  Flat 5-6 mm polyp 1-2 cm away from previously mentioned sukumar-appendiceal polyp, left alone today in view of likely resection of cecal cap in order to treat the periappendiceal lesion. 4.  Small polyp on the ileocecal valve, about 3 mm in size which was removed with biopsy fulguration. 5.  Pedunculated distal sigmoid polyp, about 1 cm in size, removed with snare polypectomy. PROCEDURE:  Total colonoscopy with multiple polypectomies using biopsy fulguration and snare. SURGEON:  Rock Burgess MD.    ANESTHESIA:  IV sedation with propofol per Anesthesia. BLOOD LOSS:  None. SPECIMENS:  1. Biopsies of sukumar-appendiceal polyp obtained with hot biopsy forceps. 2.  Biopsy fulguration of the ileocecal valve polyp. 3.  Pedunculated sigmoid polyp removed with snare polypectomy. COMPLICATIONS:  None. IMPLANTS:  None. INDICATIONS:  The patient is a very nice 59-year-old white male who presents for his first screening colonoscopy. He is aware of the risk of procedure including bleeding, perforation and the risk of missing small polyps and is agreeable to proceed. PROCEDURE:  After uneventful induction of IV sedation, the patient was placed in the left lateral position and the pediatric 180 stiff endoscope passed through the colon to the cecal cap without difficulty. Position was confirmed with light reflex and local anatomic landmarks and prep was satisfactory.   On introduction of the scope, when we first reached the ileocecal valve area and photographed to document location, there was a 3-4 mm polyp noted on top of the ileocecal valve which was removed with biopsy fulguration. However, after doing that as we advanced the scope down into the cecal cap, at the area of the appendiceal orifice, there is a circumferential sessile polyp which incorporates it appears the entire luminal orifice area of the appendix. The lesion is not endoscopically removable without risk of perforation given its location, so multiple photographs were obtained of it and then hot biopsies were obtained to document pathology. It was noted that there was another polyp about 1-2 cm away from this, but given that the treatment for the periappendiceal luminal lesion would be resection, this would be handled at the same time as the periappendiceal lesion. Subsequently, the scope was slowly and carefully pulled back. The remainder of the ascending and transverse colon were normal.  Descending colon was normal.  The sigmoid had a few small diverticulum and a pedunculated polyp at the distal sigmoid which was snared and retrieved. The scope was retrieved to the rectum which was unremarkable. Anal canal was negative. Air was aspirated, scope was removed and the exam terminated. He tolerated the exam well, remained stable and left with a benign abdomen. He will return to the office in a week or so by which time we should have the pathology back and discuss his options for handling his periappendiceal luminal lesion.       Trevon Levy MD      [id number]  D:  03/27/2019 10:56  T:  03/27/2019 11:02  JOB #:  [id number]  CC:  Mayuri Kim MD

## 2019-04-16 ENCOUNTER — HOSPITAL ENCOUNTER (OUTPATIENT)
Dept: PREADMISSION TESTING | Age: 69
Discharge: HOME | End: 2019-04-16

## 2019-04-16 ENCOUNTER — HOSPITAL ENCOUNTER (OUTPATIENT)
Dept: GENERAL RADIOLOGY | Age: 69
Discharge: HOME | End: 2019-04-16
Attending: COLON & RECTAL SURGERY
Payer: MEDICARE

## 2019-04-16 VITALS
HEART RATE: 60 BPM | BODY MASS INDEX: 31.97 KG/M2 | SYSTOLIC BLOOD PRESSURE: 170 MMHG | HEIGHT: 72 IN | TEMPERATURE: 97.8 F | WEIGHT: 236 LBS | DIASTOLIC BLOOD PRESSURE: 87 MMHG

## 2019-04-16 PROCEDURE — 71046 X-RAY EXAM CHEST 2 VIEWS: CPT

## 2019-04-16 RX ORDER — ASPIRIN 81 MG/1
81 TABLET ORAL DAILY
COMMUNITY
End: 2019-04-24

## 2019-04-16 NOTE — PERIOP NOTES
PATIENT GIVEN SURGICAL SITE INFECTION FAQ HANDOUT AND HAND WASHING TIP SHEET. PREOP INSTRUCTIONS REVIEWED AND PATIENT VERBALIZES UNDERSTANDING OF INSTRUCTIONS. PATIENT HAS BEEN GIVEN THE OPPORTUNITY TO ASK QUESTIONS. Chg wipes and incentive c instructions were given to the pt. Eras book review was done.

## 2019-04-17 NOTE — PERIOP NOTES
Dr Bora Schumacher office called re: orders placed in cc after patient had left the PAT unit. Labs not collected, but ordered:  Magnesium  Phosphorus  PT/INR  PTT  T&S    PER DR STALIN AGUILAR'S NURSE, PT IS TO HAVE THESE LABS DRAWN ON DOS.   ORDERS PLACED IN CC

## 2019-04-20 ENCOUNTER — ANESTHESIA EVENT (OUTPATIENT)
Dept: SURGERY | Age: 69
DRG: 331 | End: 2019-04-20
Payer: MEDICARE

## 2019-04-22 ENCOUNTER — ANESTHESIA EVENT (OUTPATIENT)
Dept: ENDOSCOPY | Age: 69
DRG: 331 | End: 2019-04-22
Payer: MEDICARE

## 2019-04-22 ENCOUNTER — HOSPITAL ENCOUNTER (INPATIENT)
Age: 69
LOS: 2 days | Discharge: HOME | DRG: 331 | End: 2019-04-24
Attending: COLON & RECTAL SURGERY | Admitting: COLON & RECTAL SURGERY
Payer: MEDICARE

## 2019-04-22 ENCOUNTER — ANESTHESIA (OUTPATIENT)
Dept: ENDOSCOPY | Age: 69
DRG: 331 | End: 2019-04-22
Payer: MEDICARE

## 2019-04-22 ENCOUNTER — ANESTHESIA (OUTPATIENT)
Dept: SURGERY | Age: 69
DRG: 331 | End: 2019-04-22
Payer: MEDICARE

## 2019-04-22 DIAGNOSIS — Z90.49 S/P RIGHT HEMICOLECTOMY: Primary | ICD-10-CM

## 2019-04-22 PROBLEM — K63.5 COLON POLYP: Status: ACTIVE | Noted: 2019-04-22

## 2019-04-22 LAB
ABO + RH BLD: NORMAL
BLOOD GROUP ANTIBODIES SERPL: NORMAL
GLUCOSE BLD STRIP.AUTO-MCNC: 150 MG/DL (ref 65–100)
GLUCOSE BLD STRIP.AUTO-MCNC: 169 MG/DL (ref 65–100)
GLUCOSE BLD STRIP.AUTO-MCNC: 183 MG/DL (ref 65–100)
GLUCOSE BLD STRIP.AUTO-MCNC: 224 MG/DL (ref 65–100)
MAGNESIUM SERPL-MCNC: 2.3 MG/DL (ref 1.6–2.4)
PHOSPHATE SERPL-MCNC: 2.6 MG/DL (ref 2.6–4.7)
SERVICE CMNT-IMP: ABNORMAL
SPECIMEN EXP DATE BLD: NORMAL

## 2019-04-22 PROCEDURE — 8E0W4CZ ROBOTIC ASSISTED PROCEDURE OF TRUNK REGION, PERCUTANEOUS ENDOSCOPIC APPROACH: ICD-10-PCS | Performed by: COLON & RECTAL SURGERY

## 2019-04-22 PROCEDURE — 77030022704 HC SUT VLOC COVD -B: Performed by: COLON & RECTAL SURGERY

## 2019-04-22 PROCEDURE — 77030002996 HC SUT SLK J&J -A: Performed by: COLON & RECTAL SURGERY

## 2019-04-22 PROCEDURE — 77030027957 HC TBNG IRR ENDOGTR BUSS -B: Performed by: COLON & RECTAL SURGERY

## 2019-04-22 PROCEDURE — 74011000250 HC RX REV CODE- 250: Performed by: COLON & RECTAL SURGERY

## 2019-04-22 PROCEDURE — 84100 ASSAY OF PHOSPHORUS: CPT

## 2019-04-22 PROCEDURE — 77030011640 HC PAD GRND REM COVD -A: Performed by: COLON & RECTAL SURGERY

## 2019-04-22 PROCEDURE — 77030008771 HC TU NG SALEM SUMP -A: Performed by: ANESTHESIOLOGY

## 2019-04-22 PROCEDURE — 77030032522 HC SHT STPL PK ENDOWR INTU -B: Performed by: COLON & RECTAL SURGERY

## 2019-04-22 PROCEDURE — 77030031492 HC PRT ACC BLNT AIRSEAL CNMD -B: Performed by: COLON & RECTAL SURGERY

## 2019-04-22 PROCEDURE — 77030013533 HC SEAL FBRN TISSL RDYTUSE 10ML BAXT -E: Performed by: COLON & RECTAL SURGERY

## 2019-04-22 PROCEDURE — 86900 BLOOD TYPING SEROLOGIC ABO: CPT

## 2019-04-22 PROCEDURE — 77030013992 HC SNR POLYP ENDOSC BSC -B: Performed by: COLON & RECTAL SURGERY

## 2019-04-22 PROCEDURE — 77030016151 HC PROTCTR LNS DFOG COVD -B: Performed by: COLON & RECTAL SURGERY

## 2019-04-22 PROCEDURE — 77030026438 HC STYL ET INTUB CARD -A: Performed by: ANESTHESIOLOGY

## 2019-04-22 PROCEDURE — 76060000032 HC ANESTHESIA 0.5 TO 1 HR: Performed by: COLON & RECTAL SURGERY

## 2019-04-22 PROCEDURE — 77030031139 HC SUT VCRL2 J&J -A: Performed by: COLON & RECTAL SURGERY

## 2019-04-22 PROCEDURE — 65270000032 HC RM SEMIPRIVATE

## 2019-04-22 PROCEDURE — 77030008684 HC TU ET CUF COVD -B: Performed by: ANESTHESIOLOGY

## 2019-04-22 PROCEDURE — 76060000037 HC ANESTHESIA 3 TO 3.5 HR: Performed by: COLON & RECTAL SURGERY

## 2019-04-22 PROCEDURE — 74011250636 HC RX REV CODE- 250/636

## 2019-04-22 PROCEDURE — 76010000878 HC OR TIME 3 TO 3.5HR INTENSV - TIER 2: Performed by: COLON & RECTAL SURGERY

## 2019-04-22 PROCEDURE — 77030034667 HC ACC PLATFRM ENDO GELPNT AMR -E: Performed by: COLON & RECTAL SURGERY

## 2019-04-22 PROCEDURE — 77030020782 HC GWN BAIR PAWS FLX 3M -B

## 2019-04-22 PROCEDURE — 77030018836 HC SOL IRR NACL ICUM -A: Performed by: COLON & RECTAL SURGERY

## 2019-04-22 PROCEDURE — 74011000258 HC RX REV CODE- 258

## 2019-04-22 PROCEDURE — 36415 COLL VENOUS BLD VENIPUNCTURE: CPT

## 2019-04-22 PROCEDURE — 77030034133 HC APPL ENDOSCP FLX SPRY BAXT -C: Performed by: COLON & RECTAL SURGERY

## 2019-04-22 PROCEDURE — 77030035277 HC OBTRTR BLDELSS DISP INTU -B: Performed by: COLON & RECTAL SURGERY

## 2019-04-22 PROCEDURE — 77030003657 HC NDL SCLER BSC -B: Performed by: COLON & RECTAL SURGERY

## 2019-04-22 PROCEDURE — 77030035279 HC SEAL VSL ENDOWR XI INTU -I2: Performed by: COLON & RECTAL SURGERY

## 2019-04-22 PROCEDURE — 76210000016 HC OR PH I REC 1 TO 1.5 HR: Performed by: COLON & RECTAL SURGERY

## 2019-04-22 PROCEDURE — 77030008756 HC TU IRR SUC STRY -B: Performed by: COLON & RECTAL SURGERY

## 2019-04-22 PROCEDURE — 76040000007: Performed by: COLON & RECTAL SURGERY

## 2019-04-22 PROCEDURE — 82962 GLUCOSE BLOOD TEST: CPT

## 2019-04-22 PROCEDURE — 77030039266 HC ADH SKN EXOFIN S2SG -A: Performed by: COLON & RECTAL SURGERY

## 2019-04-22 PROCEDURE — 77030040260 HC STPLR RELD SUREFORM DVNCI INTU -C: Performed by: COLON & RECTAL SURGERY

## 2019-04-22 PROCEDURE — 88309 TISSUE EXAM BY PATHOLOGIST: CPT

## 2019-04-22 PROCEDURE — 77030002966 HC SUT PDS J&J -A: Performed by: COLON & RECTAL SURGERY

## 2019-04-22 PROCEDURE — 77030002933 HC SUT MCRYL J&J -A: Performed by: COLON & RECTAL SURGERY

## 2019-04-22 PROCEDURE — 74011000258 HC RX REV CODE- 258: Performed by: COLON & RECTAL SURGERY

## 2019-04-22 PROCEDURE — 74011250637 HC RX REV CODE- 250/637: Performed by: COLON & RECTAL SURGERY

## 2019-04-22 PROCEDURE — 77030020703 HC SEAL CANN DISP INTU -B: Performed by: COLON & RECTAL SURGERY

## 2019-04-22 PROCEDURE — 0DBH8ZZ EXCISION OF CECUM, VIA NATURAL OR ARTIFICIAL OPENING ENDOSCOPIC: ICD-10-PCS | Performed by: COLON & RECTAL SURGERY

## 2019-04-22 PROCEDURE — 77030020263 HC SOL INJ SOD CL0.9% LFCR 1000ML: Performed by: COLON & RECTAL SURGERY

## 2019-04-22 PROCEDURE — 77030013532 HC SEAL FBRN TISSL RDYTUSE 4ML BAXT -C: Performed by: COLON & RECTAL SURGERY

## 2019-04-22 PROCEDURE — 77030013079 HC BLNKT BAIR HGGR 3M -A: Performed by: ANESTHESIOLOGY

## 2019-04-22 PROCEDURE — 74011000250 HC RX REV CODE- 250

## 2019-04-22 PROCEDURE — 77030019908 HC STETH ESOPH SIMS -A: Performed by: ANESTHESIOLOGY

## 2019-04-22 PROCEDURE — 74011250636 HC RX REV CODE- 250/636: Performed by: COLON & RECTAL SURGERY

## 2019-04-22 PROCEDURE — 77030040259 HC STPLR DEV SUREFORM DVNCI INTU -F: Performed by: COLON & RECTAL SURGERY

## 2019-04-22 PROCEDURE — 77030035489 HC REDUCR CANN ENDOWR INTU -C: Performed by: COLON & RECTAL SURGERY

## 2019-04-22 PROCEDURE — 77030038665 HC SOL ELEVIEW INJ AGNT ARPH -B: Performed by: COLON & RECTAL SURGERY

## 2019-04-22 PROCEDURE — 0DTF4ZZ RESECTION OF RIGHT LARGE INTESTINE, PERCUTANEOUS ENDOSCOPIC APPROACH: ICD-10-PCS | Performed by: COLON & RECTAL SURGERY

## 2019-04-22 PROCEDURE — 77030012893

## 2019-04-22 PROCEDURE — 77030032490 HC SLV COMPR SCD KNE COVD -B: Performed by: COLON & RECTAL SURGERY

## 2019-04-22 PROCEDURE — 83735 ASSAY OF MAGNESIUM: CPT

## 2019-04-22 PROCEDURE — 77030035278 HC STPLR SEAL ENDOWR INTU -B: Performed by: COLON & RECTAL SURGERY

## 2019-04-22 PROCEDURE — 88307 TISSUE EXAM BY PATHOLOGIST: CPT

## 2019-04-22 PROCEDURE — 77030005515 HC CATH URETH FOL14 BARD -B: Performed by: COLON & RECTAL SURGERY

## 2019-04-22 PROCEDURE — 74011000254 HC RX REV CODE- 254

## 2019-04-22 PROCEDURE — 88305 TISSUE EXAM BY PATHOLOGIST: CPT

## 2019-04-22 RX ORDER — BUPIVACAINE HYDROCHLORIDE AND EPINEPHRINE 2.5; 5 MG/ML; UG/ML
INJECTION, SOLUTION EPIDURAL; INFILTRATION; INTRACAUDAL; PERINEURAL AS NEEDED
Status: DISCONTINUED | OUTPATIENT
Start: 2019-04-22 | End: 2019-04-22 | Stop reason: HOSPADM

## 2019-04-22 RX ORDER — INDOCYANINE GREEN AND WATER 25 MG
KIT INJECTION AS NEEDED
Status: DISCONTINUED | OUTPATIENT
Start: 2019-04-22 | End: 2019-04-22 | Stop reason: HOSPADM

## 2019-04-22 RX ORDER — ATROPINE SULFATE 0.1 MG/ML
0.5 INJECTION INTRAVENOUS
Status: ACTIVE | OUTPATIENT
Start: 2019-04-22 | End: 2019-04-23

## 2019-04-22 RX ORDER — MIDAZOLAM HYDROCHLORIDE 1 MG/ML
1 INJECTION, SOLUTION INTRAMUSCULAR; INTRAVENOUS AS NEEDED
Status: DISCONTINUED | OUTPATIENT
Start: 2019-04-22 | End: 2019-04-22 | Stop reason: HOSPADM

## 2019-04-22 RX ORDER — SODIUM CHLORIDE, SODIUM LACTATE, POTASSIUM CHLORIDE, CALCIUM CHLORIDE 600; 310; 30; 20 MG/100ML; MG/100ML; MG/100ML; MG/100ML
75 INJECTION, SOLUTION INTRAVENOUS CONTINUOUS
Status: DISCONTINUED | OUTPATIENT
Start: 2019-04-22 | End: 2019-04-22 | Stop reason: HOSPADM

## 2019-04-22 RX ORDER — FLUMAZENIL 0.1 MG/ML
0.2 INJECTION INTRAVENOUS
Status: ACTIVE | OUTPATIENT
Start: 2019-04-22 | End: 2019-04-22

## 2019-04-22 RX ORDER — LIDOCAINE HYDROCHLORIDE 20 MG/ML
INJECTION, SOLUTION EPIDURAL; INFILTRATION; INTRACAUDAL; PERINEURAL AS NEEDED
Status: DISCONTINUED | OUTPATIENT
Start: 2019-04-22 | End: 2019-04-22 | Stop reason: HOSPADM

## 2019-04-22 RX ORDER — SODIUM CHLORIDE, SODIUM LACTATE, POTASSIUM CHLORIDE, CALCIUM CHLORIDE 600; 310; 30; 20 MG/100ML; MG/100ML; MG/100ML; MG/100ML
75 INJECTION, SOLUTION INTRAVENOUS CONTINUOUS
Status: DISPENSED | OUTPATIENT
Start: 2019-04-22 | End: 2019-04-23

## 2019-04-22 RX ORDER — INSULIN LISPRO 100 [IU]/ML
INJECTION, SOLUTION INTRAVENOUS; SUBCUTANEOUS
Status: DISCONTINUED | OUTPATIENT
Start: 2019-04-23 | End: 2019-04-24 | Stop reason: HOSPADM

## 2019-04-22 RX ORDER — MAGNESIUM SULFATE HEPTAHYDRATE 40 MG/ML
INJECTION, SOLUTION INTRAVENOUS AS NEEDED
Status: DISCONTINUED | OUTPATIENT
Start: 2019-04-22 | End: 2019-04-22 | Stop reason: HOSPADM

## 2019-04-22 RX ORDER — NEOSTIGMINE METHYLSULFATE 1 MG/ML
INJECTION INTRAVENOUS AS NEEDED
Status: DISCONTINUED | OUTPATIENT
Start: 2019-04-22 | End: 2019-04-22 | Stop reason: HOSPADM

## 2019-04-22 RX ORDER — DEXAMETHASONE SODIUM PHOSPHATE 4 MG/ML
INJECTION, SOLUTION INTRA-ARTICULAR; INTRALESIONAL; INTRAMUSCULAR; INTRAVENOUS; SOFT TISSUE AS NEEDED
Status: DISCONTINUED | OUTPATIENT
Start: 2019-04-22 | End: 2019-04-22 | Stop reason: HOSPADM

## 2019-04-22 RX ORDER — CELECOXIB 100 MG/1
100 CAPSULE ORAL 2 TIMES DAILY
Status: DISCONTINUED | OUTPATIENT
Start: 2019-04-23 | End: 2019-04-24 | Stop reason: HOSPADM

## 2019-04-22 RX ORDER — LIDOCAINE HYDROCHLORIDE 20 MG/ML
INJECTION, SOLUTION EPIDURAL; INFILTRATION; INTRACAUDAL; PERINEURAL
Status: DISCONTINUED | OUTPATIENT
Start: 2019-04-22 | End: 2019-04-22 | Stop reason: HOSPADM

## 2019-04-22 RX ORDER — ACETAMINOPHEN 10 MG/ML
INJECTION, SOLUTION INTRAVENOUS AS NEEDED
Status: DISCONTINUED | OUTPATIENT
Start: 2019-04-22 | End: 2019-04-22 | Stop reason: HOSPADM

## 2019-04-22 RX ORDER — NALOXONE HYDROCHLORIDE 0.4 MG/ML
0.4 INJECTION, SOLUTION INTRAMUSCULAR; INTRAVENOUS; SUBCUTANEOUS
Status: ACTIVE | OUTPATIENT
Start: 2019-04-22 | End: 2019-04-22

## 2019-04-22 RX ORDER — GABAPENTIN 300 MG/1
600 CAPSULE ORAL ONCE
Status: COMPLETED | OUTPATIENT
Start: 2019-04-22 | End: 2019-04-22

## 2019-04-22 RX ORDER — ENOXAPARIN SODIUM 100 MG/ML
40 INJECTION SUBCUTANEOUS EVERY 24 HOURS
Status: DISCONTINUED | OUTPATIENT
Start: 2019-04-23 | End: 2019-04-24 | Stop reason: HOSPADM

## 2019-04-22 RX ORDER — EPHEDRINE SULFATE 50 MG/ML
INJECTION, SOLUTION INTRAVENOUS AS NEEDED
Status: DISCONTINUED | OUTPATIENT
Start: 2019-04-22 | End: 2019-04-22 | Stop reason: HOSPADM

## 2019-04-22 RX ORDER — SODIUM CHLORIDE 9 MG/ML
INJECTION, SOLUTION INTRAVENOUS
Status: DISCONTINUED | OUTPATIENT
Start: 2019-04-22 | End: 2019-04-22 | Stop reason: HOSPADM

## 2019-04-22 RX ORDER — SODIUM CHLORIDE 0.9 % (FLUSH) 0.9 %
5-40 SYRINGE (ML) INJECTION AS NEEDED
Status: DISCONTINUED | OUTPATIENT
Start: 2019-04-22 | End: 2019-04-24 | Stop reason: HOSPADM

## 2019-04-22 RX ORDER — LIDOCAINE HYDROCHLORIDE ANHYDROUS AND DEXTROSE MONOHYDRATE .8; 5 G/100ML; G/100ML
1 INJECTION, SOLUTION INTRAVENOUS CONTINUOUS
Status: DISCONTINUED | OUTPATIENT
Start: 2019-04-22 | End: 2019-04-24 | Stop reason: HOSPADM

## 2019-04-22 RX ORDER — ALVIMOPAN 12 MG/1
12 CAPSULE ORAL ONCE
Status: COMPLETED | OUTPATIENT
Start: 2019-04-22 | End: 2019-04-22

## 2019-04-22 RX ORDER — CELECOXIB 200 MG/1
200 CAPSULE ORAL ONCE
Status: COMPLETED | OUTPATIENT
Start: 2019-04-22 | End: 2019-04-22

## 2019-04-22 RX ORDER — SODIUM CHLORIDE 9 MG/ML
50 INJECTION, SOLUTION INTRAVENOUS CONTINUOUS
Status: DISPENSED | OUTPATIENT
Start: 2019-04-22 | End: 2019-04-22

## 2019-04-22 RX ORDER — ALVIMOPAN 12 MG/1
12 CAPSULE ORAL 2 TIMES DAILY
Status: DISCONTINUED | OUTPATIENT
Start: 2019-04-23 | End: 2019-04-24

## 2019-04-22 RX ORDER — EPINEPHRINE 0.1 MG/ML
1 INJECTION INTRACARDIAC; INTRAVENOUS
Status: DISPENSED | OUTPATIENT
Start: 2019-04-22 | End: 2019-04-23

## 2019-04-22 RX ORDER — FENTANYL CITRATE 50 UG/ML
25 INJECTION, SOLUTION INTRAMUSCULAR; INTRAVENOUS
Status: DISCONTINUED | OUTPATIENT
Start: 2019-04-22 | End: 2019-04-22 | Stop reason: HOSPADM

## 2019-04-22 RX ORDER — INSULIN LISPRO 100 [IU]/ML
INJECTION, SOLUTION INTRAVENOUS; SUBCUTANEOUS
Status: DISCONTINUED | OUTPATIENT
Start: 2019-04-22 | End: 2019-04-22 | Stop reason: SDUPTHER

## 2019-04-22 RX ORDER — SUCCINYLCHOLINE CHLORIDE 20 MG/ML INJECTION SOLUTION
SOLUTION AS NEEDED
Status: DISCONTINUED | OUTPATIENT
Start: 2019-04-22 | End: 2019-04-22 | Stop reason: HOSPADM

## 2019-04-22 RX ORDER — ONDANSETRON 2 MG/ML
4 INJECTION INTRAMUSCULAR; INTRAVENOUS AS NEEDED
Status: DISCONTINUED | OUTPATIENT
Start: 2019-04-22 | End: 2019-04-22 | Stop reason: HOSPADM

## 2019-04-22 RX ORDER — DEXTROSE 50 % IN WATER (D50W) INTRAVENOUS SYRINGE
25-50 AS NEEDED
Status: DISCONTINUED | OUTPATIENT
Start: 2019-04-22 | End: 2019-04-22

## 2019-04-22 RX ORDER — GLYCOPYRROLATE 0.2 MG/ML
INJECTION INTRAMUSCULAR; INTRAVENOUS AS NEEDED
Status: DISCONTINUED | OUTPATIENT
Start: 2019-04-22 | End: 2019-04-22 | Stop reason: HOSPADM

## 2019-04-22 RX ORDER — ACETAMINOPHEN 500 MG
1000 TABLET ORAL ONCE
Status: COMPLETED | OUTPATIENT
Start: 2019-04-22 | End: 2019-04-22

## 2019-04-22 RX ORDER — ONDANSETRON 4 MG/1
4 TABLET, ORALLY DISINTEGRATING ORAL
Status: DISCONTINUED | OUTPATIENT
Start: 2019-04-22 | End: 2019-04-24 | Stop reason: HOSPADM

## 2019-04-22 RX ORDER — KETOROLAC TROMETHAMINE 30 MG/ML
15 INJECTION, SOLUTION INTRAMUSCULAR; INTRAVENOUS EVERY 6 HOURS
Status: COMPLETED | OUTPATIENT
Start: 2019-04-22 | End: 2019-04-23

## 2019-04-22 RX ORDER — ONDANSETRON 2 MG/ML
INJECTION INTRAMUSCULAR; INTRAVENOUS AS NEEDED
Status: DISCONTINUED | OUTPATIENT
Start: 2019-04-22 | End: 2019-04-22 | Stop reason: HOSPADM

## 2019-04-22 RX ORDER — FENTANYL CITRATE 50 UG/ML
INJECTION, SOLUTION INTRAMUSCULAR; INTRAVENOUS AS NEEDED
Status: DISCONTINUED | OUTPATIENT
Start: 2019-04-22 | End: 2019-04-22 | Stop reason: HOSPADM

## 2019-04-22 RX ORDER — NALOXONE HYDROCHLORIDE 0.4 MG/ML
0.4 INJECTION, SOLUTION INTRAMUSCULAR; INTRAVENOUS; SUBCUTANEOUS AS NEEDED
Status: DISCONTINUED | OUTPATIENT
Start: 2019-04-22 | End: 2019-04-24 | Stop reason: HOSPADM

## 2019-04-22 RX ORDER — KETAMINE HYDROCHLORIDE 100 MG/ML
INJECTION, SOLUTION INTRAMUSCULAR; INTRAVENOUS AS NEEDED
Status: DISCONTINUED | OUTPATIENT
Start: 2019-04-22 | End: 2019-04-22 | Stop reason: HOSPADM

## 2019-04-22 RX ORDER — ACETAMINOPHEN 500 MG
1000 TABLET ORAL EVERY 6 HOURS
Status: DISCONTINUED | OUTPATIENT
Start: 2019-04-22 | End: 2019-04-24 | Stop reason: HOSPADM

## 2019-04-22 RX ORDER — OXYCODONE HYDROCHLORIDE 5 MG/1
5 TABLET ORAL
Status: DISCONTINUED | OUTPATIENT
Start: 2019-04-22 | End: 2019-04-24 | Stop reason: HOSPADM

## 2019-04-22 RX ORDER — PROPOFOL 10 MG/ML
INJECTION, EMULSION INTRAVENOUS AS NEEDED
Status: DISCONTINUED | OUTPATIENT
Start: 2019-04-22 | End: 2019-04-22 | Stop reason: HOSPADM

## 2019-04-22 RX ORDER — ROCURONIUM BROMIDE 10 MG/ML
INJECTION, SOLUTION INTRAVENOUS AS NEEDED
Status: DISCONTINUED | OUTPATIENT
Start: 2019-04-22 | End: 2019-04-22 | Stop reason: HOSPADM

## 2019-04-22 RX ORDER — MAGNESIUM SULFATE 100 %
4 CRYSTALS MISCELLANEOUS AS NEEDED
Status: DISCONTINUED | OUTPATIENT
Start: 2019-04-22 | End: 2019-04-24 | Stop reason: HOSPADM

## 2019-04-22 RX ORDER — MAGNESIUM SULFATE 100 %
4 CRYSTALS MISCELLANEOUS AS NEEDED
Status: DISCONTINUED | OUTPATIENT
Start: 2019-04-22 | End: 2019-04-22

## 2019-04-22 RX ORDER — DEXTROSE 50 % IN WATER (D50W) INTRAVENOUS SYRINGE
25-50 AS NEEDED
Status: DISCONTINUED | OUTPATIENT
Start: 2019-04-22 | End: 2019-04-24 | Stop reason: HOSPADM

## 2019-04-22 RX ORDER — DEXTROMETHORPHAN/PSEUDOEPHED 2.5-7.5/.8
1.2 DROPS ORAL
Status: DISCONTINUED | OUTPATIENT
Start: 2019-04-22 | End: 2019-04-24 | Stop reason: HOSPADM

## 2019-04-22 RX ORDER — SODIUM CHLORIDE 0.9 % (FLUSH) 0.9 %
5-40 SYRINGE (ML) INJECTION EVERY 8 HOURS
Status: DISCONTINUED | OUTPATIENT
Start: 2019-04-22 | End: 2019-04-24 | Stop reason: HOSPADM

## 2019-04-22 RX ADMIN — GABAPENTIN 600 MG: 300 CAPSULE ORAL at 09:32

## 2019-04-22 RX ADMIN — PROPOFOL 20 MG: 10 INJECTION, EMULSION INTRAVENOUS at 07:55

## 2019-04-22 RX ADMIN — ROCURONIUM BROMIDE 40 MG: 10 INJECTION, SOLUTION INTRAVENOUS at 13:40

## 2019-04-22 RX ADMIN — GLYCOPYRROLATE 0.2 MG: 0.2 INJECTION INTRAMUSCULAR; INTRAVENOUS at 14:14

## 2019-04-22 RX ADMIN — ACETAMINOPHEN 1000 MG: 500 TABLET ORAL at 20:36

## 2019-04-22 RX ADMIN — LIDOCAINE HYDROCHLORIDE 19.5 ML/HR: 20 INJECTION, SOLUTION EPIDURAL; INFILTRATION; INTRACAUDAL; PERINEURAL at 13:45

## 2019-04-22 RX ADMIN — KETOROLAC TROMETHAMINE 15 MG: 30 INJECTION, SOLUTION INTRAMUSCULAR; INTRAVENOUS at 20:36

## 2019-04-22 RX ADMIN — PROPOFOL 20 MG: 10 INJECTION, EMULSION INTRAVENOUS at 07:59

## 2019-04-22 RX ADMIN — NEOSTIGMINE METHYLSULFATE 4 MG: 1 INJECTION INTRAVENOUS at 16:01

## 2019-04-22 RX ADMIN — KETAMINE HYDROCHLORIDE 30 MG: 100 INJECTION, SOLUTION INTRAMUSCULAR; INTRAVENOUS at 13:44

## 2019-04-22 RX ADMIN — SODIUM CHLORIDE, SODIUM LACTATE, POTASSIUM CHLORIDE, AND CALCIUM CHLORIDE 75 ML/HR: 600; 310; 30; 20 INJECTION, SOLUTION INTRAVENOUS at 16:40

## 2019-04-22 RX ADMIN — ROCURONIUM BROMIDE 30 MG: 10 INJECTION, SOLUTION INTRAVENOUS at 14:44

## 2019-04-22 RX ADMIN — ACETAMINOPHEN 1000 MG: 500 TABLET ORAL at 09:32

## 2019-04-22 RX ADMIN — DEXAMETHASONE SODIUM PHOSPHATE 8 MG: 4 INJECTION, SOLUTION INTRA-ARTICULAR; INTRALESIONAL; INTRAMUSCULAR; INTRAVENOUS; SOFT TISSUE at 13:48

## 2019-04-22 RX ADMIN — EPHEDRINE SULFATE 10 MG: 50 INJECTION, SOLUTION INTRAVENOUS at 14:17

## 2019-04-22 RX ADMIN — PROPOFOL 20 MG: 10 INJECTION, EMULSION INTRAVENOUS at 07:58

## 2019-04-22 RX ADMIN — GLYCOPYRROLATE 0.5 MG: 0.2 INJECTION INTRAMUSCULAR; INTRAVENOUS at 16:01

## 2019-04-22 RX ADMIN — SIMETHICONE 80 MG: 20 SUSPENSION/ DROPS ORAL at 07:48

## 2019-04-22 RX ADMIN — PROPOFOL 20 MG: 10 INJECTION, EMULSION INTRAVENOUS at 08:00

## 2019-04-22 RX ADMIN — FENTANYL CITRATE 100 MCG: 50 INJECTION, SOLUTION INTRAMUSCULAR; INTRAVENOUS at 13:32

## 2019-04-22 RX ADMIN — INDOCYANINE GREEN AND WATER 7.5 MG: KIT at 15:10

## 2019-04-22 RX ADMIN — ROCURONIUM BROMIDE 10 MG: 10 INJECTION, SOLUTION INTRAVENOUS at 15:03

## 2019-04-22 RX ADMIN — PROPOFOL 100 MG: 10 INJECTION, EMULSION INTRAVENOUS at 07:42

## 2019-04-22 RX ADMIN — ROCURONIUM BROMIDE 10 MG: 10 INJECTION, SOLUTION INTRAVENOUS at 13:32

## 2019-04-22 RX ADMIN — MIDAZOLAM HYDROCHLORIDE 2 MG: 1 INJECTION, SOLUTION INTRAMUSCULAR; INTRAVENOUS at 13:21

## 2019-04-22 RX ADMIN — PROPOFOL 20 MG: 10 INJECTION, EMULSION INTRAVENOUS at 07:45

## 2019-04-22 RX ADMIN — PROPOFOL 20 MG: 10 INJECTION, EMULSION INTRAVENOUS at 07:49

## 2019-04-22 RX ADMIN — PROPOFOL 20 MG: 10 INJECTION, EMULSION INTRAVENOUS at 07:57

## 2019-04-22 RX ADMIN — ALVIMOPAN 12 MG: 12 CAPSULE ORAL at 09:32

## 2019-04-22 RX ADMIN — PROPOFOL 20 MG: 10 INJECTION, EMULSION INTRAVENOUS at 07:56

## 2019-04-22 RX ADMIN — CELECOXIB 200 MG: 200 CAPSULE ORAL at 09:32

## 2019-04-22 RX ADMIN — SUCCINYLCHOLINE CHLORIDE 20 MG/ML INJECTION SOLUTION 140 MG: SOLUTION at 13:32

## 2019-04-22 RX ADMIN — SODIUM CHLORIDE: 9 INJECTION, SOLUTION INTRAVENOUS at 07:20

## 2019-04-22 RX ADMIN — SODIUM CHLORIDE, SODIUM LACTATE, POTASSIUM CHLORIDE, AND CALCIUM CHLORIDE 75 ML/HR: 600; 310; 30; 20 INJECTION, SOLUTION INTRAVENOUS at 09:53

## 2019-04-22 RX ADMIN — LIDOCAINE HYDROCHLORIDE 100 MG: 20 INJECTION, SOLUTION EPIDURAL; INFILTRATION; INTRACAUDAL; PERINEURAL at 13:32

## 2019-04-22 RX ADMIN — PROPOFOL 20 MG: 10 INJECTION, EMULSION INTRAVENOUS at 07:51

## 2019-04-22 RX ADMIN — LIDOCAINE HYDROCHLORIDE 100 MG: 20 INJECTION, SOLUTION EPIDURAL; INFILTRATION; INTRACAUDAL; PERINEURAL at 07:41

## 2019-04-22 RX ADMIN — MAGNESIUM SULFATE HEPTAHYDRATE 2 G: 40 INJECTION, SOLUTION INTRAVENOUS at 13:44

## 2019-04-22 RX ADMIN — PROPOFOL 180 MG: 10 INJECTION, EMULSION INTRAVENOUS at 13:32

## 2019-04-22 RX ADMIN — Medication 10 ML: at 22:59

## 2019-04-22 RX ADMIN — LIDOCAINE HYDROCHLORIDE 1 MG/KG/HR: 8 INJECTION, SOLUTION INTRAVENOUS at 16:40

## 2019-04-22 RX ADMIN — CEFOTETAN DISODIUM 2 G: 2 INJECTION, POWDER, FOR SOLUTION INTRAMUSCULAR; INTRAVENOUS at 13:44

## 2019-04-22 RX ADMIN — PROPOFOL 20 MG: 10 INJECTION, EMULSION INTRAVENOUS at 07:53

## 2019-04-22 RX ADMIN — SODIUM CHLORIDE: 9 INJECTION, SOLUTION INTRAVENOUS at 13:24

## 2019-04-22 RX ADMIN — ACETAMINOPHEN 1000 MG: 10 INJECTION, SOLUTION INTRAVENOUS at 14:41

## 2019-04-22 RX ADMIN — PROPOFOL 20 MG: 10 INJECTION, EMULSION INTRAVENOUS at 07:47

## 2019-04-22 RX ADMIN — ONDANSETRON 4 MG: 2 INJECTION INTRAMUSCULAR; INTRAVENOUS at 16:04

## 2019-04-22 NOTE — PERIOP NOTES
TRANSFER - OUT REPORT: 
 
Verbal report given to Marquez Cary (name) on Ayo Levy  being transferred to room  510 on [address](unit) for routine post - op Report consisted of patients Situation, Background, Assessment and  
Recommendations(SBAR). Time Pre op antibiotic given:1344 Anesthesia Stop time: 2907 1812 Busch Present on Transfer to floor:yes Order for Busch on Chart:yes Discharge Prescriptions with Chart:no Information from the following report(s) SBAR, Kardex, OR Summary, Intake/Output, MAR, Med Rec Status and Cardiac Rhythm SR was reviewed with the receiving nurse. Opportunity for questions and clarification was provided. Is the patient on 02? YES 
     L/Min 2L Is the patient on a monitor? NO Is the nurse transporting with the patient? NO Surgical Waiting Area notified of patient's transfer from PACU? YES The following personal items collected during your admission accompanied patient upon transfer:  
Dental Appliance: Dental Appliances: None Vision: Visual Aid: Glasses Hearing Aid:   
Jewelry: Jewelry: None Clothing: Clothing: (bag of clothes returned to patient) Other Valuables: Other Valuables: Eyeglasses(returned to patient) Valuables sent to safe:

## 2019-04-22 NOTE — BRIEF OP NOTE
BRIEF OPERATIVE NOTE Date of Procedure: 4/22/2019 Preoperative Diagnosis: Family history of colon polyps Postoperative Diagnosis: 1. cecal polyp 2. cecal mass Procedure(s): 
COLONOSCOPY INJECTION 
ENDOSCOPIC POLYPECTOMY Snare cautery polypectomy Surgeon(s) and Role: Juan Lyman MD - Primary Surgical Assistant:  
 
Surgical Staff: 
Endoscopy Technician-1: Peterson Clark Endoscopy RN-1: Kisha Mabry RN No case tracking events are documented in the log. Anesthesia: MAC Estimated Blood Loss: 1cc Specimens: * No specimens in log * Findings: cecal polyp and  Cecal mass. Complications: none apparent Implants: * No implants in log *

## 2019-04-22 NOTE — ANESTHESIA PREPROCEDURE EVALUATION
Relevant Problems No relevant active problems Anesthetic History Review of Systems / Medical History Patient summary reviewed, nursing notes reviewed and pertinent labs reviewed Pulmonary Neuro/Psych Cardiovascular Hypertension CAD and cardiac stents Comments: ECHO from January 2019 shows a 43% EF with mild MR  
GI/Hepatic/Renal 
  
 
 
 
 
 
 Endo/Other Diabetes Other Findings Physical Exam 
 
Airway Mallampati: I 
TM Distance: > 6 cm Neck ROM: normal range of motion Mouth opening: Normal 
 
 Cardiovascular Rhythm: regular Rate: normal 
 
 
 
 Dental 
No notable dental hx Pulmonary Breath sounds clear to auscultation Abdominal 
 
 
 
 Other Findings Anesthetic Plan ASA: 3 Anesthesia type: general 
 
 
 
 
Induction: Intravenous Anesthetic plan and risks discussed with: Patient

## 2019-04-22 NOTE — PROGRESS NOTES

## 2019-04-22 NOTE — ANESTHESIA POSTPROCEDURE EVALUATION
Procedure(s): 
ROBOTIC ASSISTED RIGHT HEMICOLECTOMY          (E R A S). general 
 
Anesthesia Post Evaluation Patient location during evaluation: PACU Patient participation: complete - patient participated Level of consciousness: awake and alert Pain management: adequate Airway patency: patent Anesthetic complications: no 
Cardiovascular status: acceptable Respiratory status: acceptable Hydration status: acceptable Comments: Seen and ready for discharge Post anesthesia nausea and vomiting:  none Vitals Value Taken Time /72 4/22/2019  5:30 PM  
Temp 37 °C (98.6 °F) 4/22/2019  5:15 PM  
Pulse 70 4/22/2019  5:39 PM  
Resp 19 4/22/2019  5:39 PM  
SpO2 100 % 4/22/2019  5:39 PM  
Vitals shown include unvalidated device data.

## 2019-04-22 NOTE — ROUTINE PROCESS
Julioneena Tor 1950 
[id number] Situation: 
Verbal report received from: Drew Montieloy Procedure: Procedure(s): 
COLONOSCOPY INJECTION 
ENDOSCOPIC POLYPECTOMY Background: 
 
Preoperative diagnosis: Family history of colon polyps Postoperative diagnosis: 1. cecal polyp 2. cecal mass :  Dr. Elayne Ferrell Assistant(s): Endoscopy Technician-1: Peetr Jeong Endoscopy RN-1: Clara Garcia RN Specimens:  
ID Type Source Tests Collected by Time Destination 1 : cecal polp Preservative Cecum  Christiano Lafleur MD 4/22/2019 5420 Pathology H. Pylori  no Assessment: 
Intra-procedure medications Anesthesia gave intra-procedure sedation and medications, see anesthesia flow sheet yes Intravenous fluids: NS@ [place] Vital signs stable Abdominal assessment: round and soft Recommendation: 
Discharge patient per MD order. Return to floor Family or Friend Permission to share finding with family or friend yes

## 2019-04-22 NOTE — OP NOTES
Colonoscopy Procedure Note    Indications: Previous adenomatous polyp    Anesthesia/Sedation: MAC anesthesia Propofol    Pre-Procedure Exam:  Airway: clear   Heart: normal S1and S2    Lungs: clear bilateral  Abdomen: soft, nontender, bowel sounds present and normal in all quadrants   Mental Status: awake, alert, and oriented to person, place, and time      Procedure in Detail:  Informed consent was obtained for the procedure, including sedation. Risks of perforation, hemorrhage, adverse drug reaction, and aspiration were discussed. The patient was placed in the left lateral decubitus position. Based on the pre-procedure assessment, including review of the patient's medical history, medications, allergies, and review of systems, he had been deemed to be an appropriate candidate for moderate sedation; he was therefore sedated with the medications listed above. The patient was monitored continuously with ECG tracing, pulse oximetry, blood pressure monitoring, and direct observations. A rectal examination was performed. The [id number] was inserted into the rectum and advanced under direct vision to the cecum, which was identified by the ileocecal valve and appendiceal orifice. The quality of the colonic preparation was excellent. A careful inspection was made as the colonoscope was withdrawn, including a retroflexed view of the rectum; findings and interventions are described below. Appropriate photodocumentation was obtained. Findings:   Rectum:   Normal  Sigmoid:   Normal  Descending Colon:   Normal  Transverse Colon:   Normal  Ascending Colon:   Normal  Cecum:     - 5mm polyp removed with snare cautery polypectomy. Cecal mass approx 1cm was unable to be lifted after injection of eleview. Snare unable to be performed. Specimens: Specimens were collected and sent to pathology. EBL: Minimal    Complications: None; patient tolerated the procedure well.     Attending Attestation: I performed the procedure.     Recommendations:    - right hemicolectomy today

## 2019-04-22 NOTE — OP NOTES
Preop Diagnosis: irretrievable polyp in the cecum  Postop Diagnosis: Same  Procedure: Robotic right hemicolectomy  Surgeon: Dr. Elayne Ferrell  Anesthesia: General +Local  EBL:  50cc  Drains: none  Assistant: Sujatha Burleson  Complications: none apparent  Implants: none    Indications patient with an irretrievable polyp in the cecum    Procedure: This patient was consented and brought to the operating room and placed on the table in the normal supine position. The patient was intubated and the abdomen was prepped and draped in the usual sterile fashion. Time out was performed and the patient received preoperative antibiotics. A suprapubic incision was made and the abdomen was entered without injuring intraabdominal contents. A gel port was placed and the staple canula was inserted. The abdomen was insufflated to create pneumoperitoneum. The abdomen was inspected. A camera port was placed just off the midline to the left and inferior to the umbilicus. An 8mm trocar was placed in the left upper quadrant and an 8mm airseal port was inserted in the left lateral position. The patient was positioned to be able to dissect out the terminal ileum and hepatic flexure. I dissected out the colon in a medial to lateral fashion to identify the ileocolic artery. The duodenum was identified and uninjured. The ileocolic artery was taken with a white load on the robotic stapler. I then took the remainder of the mesentery with a vessel sealer. I injected firefly to make sure there was good blood supply to the colonic and terminal ileal segments of the anastomosis. The bowel was transected with a green load on the colon and a blue load on the small bowel. The specimen was moved out of the way and the terminal ileum was brought up alongside the Transverse colon without tension or twists in the mesentery. They were sutured in place in an isoperistaltic fashion with 2-0 silk sutures.  An entereotomy and colotomy were made and a common channel was created with blue loads on the robotic stapler. This was hemostatic. The common enterotomy was closed with 2-0 vlock suture in 2 layers. The first layer was a full thickness running suture. The second layer was an inverting lembert suture. The anastomosis was irrigated and sprayed with aerosolized tisseal. The specimen was removed through the suprapubic incision. The specimen was opened on the back table making sure the lesion was included in the specimen. I announced \"time to close\" and the abdomen was redraped with a new setup and instruments. The suprapubic incision was closed in 2 layers. The peritoneum was closed with 3-0 vicryl. The fascia was closed with #1 PDS. The deep dermal layer was closed with 3-0 vicryl. All incisions were irrigated and closed with 4-0 monocryl in a running subcuticular fashion. Dermabond was used as a sterile dressing. Instruments and sponge count was correct x2. The patient tolerated the procedure and was transferred to the recovery room in stable condition.

## 2019-04-22 NOTE — BRIEF OP NOTE
BRIEF OPERATIVE NOTE Date of Procedure: 4/22/2019 Preoperative Diagnosis:  COLON POLYPS Postoperative Diagnosis:  COLON POLYPS Procedure(s): 
ROBOTIC ASSISTED RIGHT HEMICOLECTOMY          (E R A S) Surgeon(s) and Role: Carine Fowler MD - Primary Surgical Assistant: Chapito Mcclelland Surgical Staff: 
Circ-1: Sheree Mercer Circ-Relief: Justine Petersen RN Scrub Tech-1: Aparna Harrell Surg Asst-1: Cinthya Harrison Float Staff: Justine Petersen RN Event Time In Time Out Incision Start [id number] Incision Close Anesthesia: General  
Estimated Blood Loss: 50cc Specimens:  
ID Type Source Tests Collected by Time Destination 1 : RIGHT COLON Fresh Colon, Right  Carine Fowler MD 4/22/2019 1601 Pathology Findings: polyp in the cecum Complications: none apparent Implants: * No implants in log *

## 2019-04-22 NOTE — H&P
Colon and Rectal Surgery History and Physical 
 
Subjective:  
  
Darrel Johns is a 79 y.o. male who has cecal polyp Patient Active Problem List  
 Diagnosis Date Noted  Encounter for screening colonoscopy 2019  Coronary artery disease involving native coronary artery without angina pectoris 2018  Abnormal EKG 2018  Essential hypertension 2018  Mixed hyperlipidemia 2018 Past Medical History:  
Diagnosis Date  At risk for sleep apnea 2019 COSME 6   
 CAD (coronary artery disease)  Hyperlipidemia  Hypertension  MI (myocardial infarction) ([facility] [address] 2010  Type 2 diabetes mellitus ([facility] [address] Past Surgical History:  
Procedure Laterality Date  COLONOSCOPY N/A 3/27/2019 COLONOSCOPY performed by Brian Dumont MD at [address] [facility]    
 COLONOSCOPY  
 HX HEART CATHETERIZATION  2010  
 5 stents Social History Tobacco Use  Smoking status: Former Smoker Last attempt to quit:  Years since quittin.3  Smokeless tobacco: Never Used Substance Use Topics  Alcohol use: Yes Comment: occassional 2 x year Family History Problem Relation Age of Onset  Heart Attack Father   
     fatal, age 64  Alzheimer Mother  Hypertension Mother Prior to Admission medications Medication Sig Start Date End Date Taking? Authorizing Provider  
aspirin delayed-release 81 mg tablet Take 81 mg by mouth daily. Yes Provider, Historical  
omega 3-dha-epa-fish oil (FISH OIL) 100-160-1,000 mg cap Take  by mouth. Yes Provider, Historical  
lisinopril (PRINIVIL, ZESTRIL) 40 mg tablet Take 1 Tab by mouth daily. 19  Yes Natalia Oneill MD  
metoprolol succinate (TOPROL-XL) 50 mg XL tablet Take 1 Tab by mouth daily. Patient taking differently: Take 50 mg by mouth every morning.  19  Yes Natalai Oneill MD  
 atorvastatin (LIPITOR) 20 mg tablet 20 mg nightly. 12/19/18  Yes Provider, Historical  
glipiZIDE (GLUCOTROL) 10 mg tablet Take 10 mg by mouth two (2) times a day. 11/26/18  Yes Provider, Historical  
metFORMIN (GLUCOPHAGE) 500 mg tablet Take 1,000 mg by mouth two (2) times daily (with meals). 12/17/18  Yes Provider, Historical  
pioglitazone (ACTOS) 30 mg tablet Take 30 mg by mouth daily. Yes Provider, Historical  
 
No Known Allergies Review of Systems: A comprehensive review of systems was negative except for that written in the History of Present Illness. Objective:  
 
Visit Vitals /89 Pulse 71 Temp 98 °F (36.7 °C) Resp 23 SpO2 95% Physical Exam:  
Visit Vitals /89 Pulse 71 Temp 98 °F (36.7 °C) Resp 23 SpO2 95% General:  Alert, cooperative, no distress, appears stated age. Head:  Normocephalic, without obvious abnormality, atraumatic. Eyes:  Conjunctivae/corneas clear. PERRL, EOMs intact. Fundi benign Ears:  Normal TMs and external ear canals both ears. Nose: Nares normal. Septum midline. Mucosa normal. No drainage or sinus tenderness. Throat: Lips, mucosa, and tongue normal. Teeth and gums normal.  
Neck: Supple, symmetrical, trachea midline, no adenopathy, thyroid: no enlargement/tenderness/nodules, no carotid bruit and no JVD. Back:   Symmetric, no curvature. ROM normal. No CVA tenderness. Lungs:   Clear to auscultation bilaterally. Chest wall:  No tenderness or deformity. Heart:  Regular rate and rhythm, S1, S2 normal, no murmur, click, rub or gallop. Abdomen:   Soft, non-tender. Bowel sounds normal. No masses,  No organomegaly. Genitalia:  Normal male without lesion, discharge or tenderness. Rectal:  Normal tone, normal prostate, no masses or tenderness Guaiac negative stool. Extremities: Extremities normal, atraumatic, no cyanosis or edema. Pulses: 2+ and symmetric all extremities. Skin: Skin color, texture, turgor normal. No rashes or lesions Lymph nodes: Cervical, supraclavicular, and axillary nodes normal.  
Neurologic: CNII-XII intact. Normal strength, sensation and reflexes throughout. Imaging:  images and reports reviewed Lab Review:   
Recent Results (from the past 24 hour(s)) GLUCOSE, POC Collection Time: 04/22/19  7:23 AM  
Result Value Ref Range Glucose (POC) 169 (H) 65 - 100 mg/dL Performed by [facility] and radiology: images and reports reviewed Assessment:  
 
Cecal polyp Plan: 1. I recommend proceeding with colonoscopy vs partial cecectomy/right hemicolectomy. Treatment alternatives were discussed. 2. Discussed aspects of surgical intervention, methods, risks (including by not limited to infection, bleeding, hematoma, and perforation of the intestines or solid organs), and the risks of general anesthetic. The patient understands the risks; any and all questions were answered to the patient's satisfaction. Signed By: Rosalie Mary MD   
 April 22, 2019

## 2019-04-22 NOTE — ANESTHESIA PREPROCEDURE EVALUATION
Relevant Problems No relevant active problems Anesthetic History Review of Systems / Medical History Patient summary reviewed, nursing notes reviewed and pertinent labs reviewed Pulmonary Neuro/Psych Cardiovascular Hypertension CAD Comments: ECHO from January 2019 shows a 43% EF with mild MR  
GI/Hepatic/Renal 
  
 
 
 
 
 
 Endo/Other Diabetes Other Findings Physical Exam 
 
Airway Mallampati: I 
TM Distance: > 6 cm Neck ROM: normal range of motion Mouth opening: Normal 
 
 Cardiovascular Rhythm: regular Rate: normal 
 
 
 
 Dental 
No notable dental hx Pulmonary Breath sounds clear to auscultation Abdominal 
 
 
 
 Other Findings Anesthetic Plan ASA: 2 Anesthesia type: MAC Induction: Intravenous Anesthetic plan and risks discussed with: Patient

## 2019-04-23 LAB
ANION GAP SERPL CALC-SCNC: 6 MMOL/L (ref 5–15)
BUN SERPL-MCNC: 16 MG/DL (ref 6–20)
BUN/CREAT SERPL: 13 (ref 12–20)
CALCIUM SERPL-MCNC: 8.4 MG/DL (ref 8.5–10.1)
CHLORIDE SERPL-SCNC: 108 MMOL/L (ref 97–108)
CO2 SERPL-SCNC: 24 MMOL/L (ref 21–32)
CREAT SERPL-MCNC: 1.24 MG/DL (ref 0.7–1.3)
ERYTHROCYTE [DISTWIDTH] IN BLOOD BY AUTOMATED COUNT: 13.3 % (ref 11.5–14.5)
GLUCOSE BLD STRIP.AUTO-MCNC: 168 MG/DL (ref 65–100)
GLUCOSE BLD STRIP.AUTO-MCNC: 183 MG/DL (ref 65–100)
GLUCOSE BLD STRIP.AUTO-MCNC: 192 MG/DL (ref 65–100)
GLUCOSE BLD STRIP.AUTO-MCNC: 207 MG/DL (ref 65–100)
GLUCOSE SERPL-MCNC: 183 MG/DL (ref 65–100)
HCT VFR BLD AUTO: 38 % (ref 36.6–50.3)
HGB BLD-MCNC: 12.5 G/DL (ref 12.1–17)
MAGNESIUM SERPL-MCNC: 2.4 MG/DL (ref 1.6–2.4)
MCH RBC QN AUTO: 30.7 PG (ref 26–34)
MCHC RBC AUTO-ENTMCNC: 32.9 G/DL (ref 30–36.5)
MCV RBC AUTO: 93.4 FL (ref 80–99)
NRBC # BLD: 0 K/UL (ref 0–0.01)
NRBC BLD-RTO: 0 PER 100 WBC
PHOSPHATE SERPL-MCNC: 2.6 MG/DL (ref 2.6–4.7)
PLATELET # BLD AUTO: 196 K/UL (ref 150–400)
PMV BLD AUTO: 11.2 FL (ref 8.9–12.9)
POTASSIUM SERPL-SCNC: 3.9 MMOL/L (ref 3.5–5.1)
RBC # BLD AUTO: 4.07 M/UL (ref 4.1–5.7)
SERVICE CMNT-IMP: ABNORMAL
SODIUM SERPL-SCNC: 138 MMOL/L (ref 136–145)
WBC # BLD AUTO: 11.2 K/UL (ref 4.1–11.1)

## 2019-04-23 PROCEDURE — 36415 COLL VENOUS BLD VENIPUNCTURE: CPT

## 2019-04-23 PROCEDURE — 83735 ASSAY OF MAGNESIUM: CPT

## 2019-04-23 PROCEDURE — 65270000032 HC RM SEMIPRIVATE

## 2019-04-23 PROCEDURE — 82962 GLUCOSE BLOOD TEST: CPT

## 2019-04-23 PROCEDURE — 74011000250 HC RX REV CODE- 250: Performed by: COLON & RECTAL SURGERY

## 2019-04-23 PROCEDURE — 74011250636 HC RX REV CODE- 250/636: Performed by: COLON & RECTAL SURGERY

## 2019-04-23 PROCEDURE — 74011250637 HC RX REV CODE- 250/637: Performed by: COLON & RECTAL SURGERY

## 2019-04-23 PROCEDURE — 85027 COMPLETE CBC AUTOMATED: CPT

## 2019-04-23 PROCEDURE — 80048 BASIC METABOLIC PNL TOTAL CA: CPT

## 2019-04-23 PROCEDURE — 84100 ASSAY OF PHOSPHORUS: CPT

## 2019-04-23 PROCEDURE — 74011636637 HC RX REV CODE- 636/637: Performed by: COLON & RECTAL SURGERY

## 2019-04-23 RX ORDER — GLIPIZIDE 5 MG/1
10 TABLET ORAL
Status: DISCONTINUED | OUTPATIENT
Start: 2019-04-23 | End: 2019-04-24 | Stop reason: HOSPADM

## 2019-04-23 RX ORDER — METOPROLOL SUCCINATE 50 MG/1
50 TABLET, EXTENDED RELEASE ORAL DAILY
Status: DISCONTINUED | OUTPATIENT
Start: 2019-04-23 | End: 2019-04-24 | Stop reason: HOSPADM

## 2019-04-23 RX ORDER — LISINOPRIL 20 MG/1
40 TABLET ORAL DAILY
Status: DISCONTINUED | OUTPATIENT
Start: 2019-04-23 | End: 2019-04-24 | Stop reason: HOSPADM

## 2019-04-23 RX ORDER — METFORMIN HYDROCHLORIDE 500 MG/1
1000 TABLET ORAL 2 TIMES DAILY WITH MEALS
Status: DISCONTINUED | OUTPATIENT
Start: 2019-04-23 | End: 2019-04-24 | Stop reason: HOSPADM

## 2019-04-23 RX ORDER — PIOGLITAZONEHYDROCHLORIDE 15 MG/1
30 TABLET ORAL DAILY
Status: DISCONTINUED | OUTPATIENT
Start: 2019-04-24 | End: 2019-04-24 | Stop reason: HOSPADM

## 2019-04-23 RX ADMIN — METOPROLOL SUCCINATE 50 MG: 50 TABLET, EXTENDED RELEASE ORAL at 12:45

## 2019-04-23 RX ADMIN — ACETAMINOPHEN 1000 MG: 500 TABLET ORAL at 00:57

## 2019-04-23 RX ADMIN — Medication 10 ML: at 22:10

## 2019-04-23 RX ADMIN — ACETAMINOPHEN 1000 MG: 500 TABLET ORAL at 18:52

## 2019-04-23 RX ADMIN — CELECOXIB 100 MG: 100 CAPSULE ORAL at 18:52

## 2019-04-23 RX ADMIN — ACETAMINOPHEN 1000 MG: 500 TABLET ORAL at 07:25

## 2019-04-23 RX ADMIN — LIDOCAINE HYDROCHLORIDE 1 MG/KG/HR: 8 INJECTION, SOLUTION INTRAVENOUS at 13:03

## 2019-04-23 RX ADMIN — KETOROLAC TROMETHAMINE 15 MG: 30 INJECTION, SOLUTION INTRAMUSCULAR; INTRAVENOUS at 12:48

## 2019-04-23 RX ADMIN — ALVIMOPAN 12 MG: 12 CAPSULE ORAL at 17:11

## 2019-04-23 RX ADMIN — INSULIN LISPRO 2 UNITS: 100 INJECTION, SOLUTION INTRAVENOUS; SUBCUTANEOUS at 00:57

## 2019-04-23 RX ADMIN — ALVIMOPAN 12 MG: 12 CAPSULE ORAL at 09:04

## 2019-04-23 RX ADMIN — INSULIN LISPRO 3 UNITS: 100 INJECTION, SOLUTION INTRAVENOUS; SUBCUTANEOUS at 12:46

## 2019-04-23 RX ADMIN — METFORMIN HYDROCHLORIDE 1000 MG: 500 TABLET ORAL at 17:11

## 2019-04-23 RX ADMIN — GLIPIZIDE 10 MG: 5 TABLET ORAL at 17:11

## 2019-04-23 RX ADMIN — INSULIN LISPRO 2 UNITS: 100 INJECTION, SOLUTION INTRAVENOUS; SUBCUTANEOUS at 07:11

## 2019-04-23 RX ADMIN — ENOXAPARIN SODIUM 40 MG: 100 INJECTION SUBCUTANEOUS at 07:10

## 2019-04-23 RX ADMIN — KETOROLAC TROMETHAMINE 15 MG: 30 INJECTION, SOLUTION INTRAMUSCULAR; INTRAVENOUS at 00:57

## 2019-04-23 RX ADMIN — ACETAMINOPHEN 1000 MG: 500 TABLET ORAL at 12:45

## 2019-04-23 RX ADMIN — KETOROLAC TROMETHAMINE 15 MG: 30 INJECTION, SOLUTION INTRAMUSCULAR; INTRAVENOUS at 07:10

## 2019-04-23 RX ADMIN — SODIUM CHLORIDE, SODIUM LACTATE, POTASSIUM CHLORIDE, AND CALCIUM CHLORIDE 75 ML/HR: 600; 310; 30; 20 INJECTION, SOLUTION INTRAVENOUS at 04:52

## 2019-04-23 RX ADMIN — LISINOPRIL 40 MG: 20 TABLET ORAL at 12:45

## 2019-04-23 NOTE — ROUTINE PROCESS
Bedside shift change report given to Virginia Dash (oncoming nurse) by Griselda Balk (offgoing nurse). Report included the following information SBAR, Kardex, Intake/Output, MAR and Recent Results.

## 2019-04-23 NOTE — DIABETES MGMT
DTC Consult Note Recommendations/ Comments: Consult received for assessment of home mgmt. Pt is s/p colorectal surgery ([facility] protocol). Pt reports home medication for diabetes that are listed in PTA. He reports compliance with these meds and denies side effects. He reports to monitor BG twice a week and endorses avg BG of 130-150 mg/dl when monitoring. Last obtained A1c was 7.3% on 4/4/19 per [facility] paperwork wife provided. Reviewed optimal A1c and BG targets and discussed the potential for delay wound healing/risk of infection if BG remains high after surgery. Encouraged monitoring at least once daily when pt returns home and continue all DM meds as ordered by MD. PT reports understanding. Reviewed s/sx of high and low BG, appropriate treatments and when to call MD. Reviewed basic diet education including plate method and avoidance of SSB. Pt reports to eat three meals daily and at least one snack. Does endorse drinking juice daily and snacking on things like m&m's - encouraged avoidance of concentrated sweets and increase in combined snacks (carb + protein) - examples provided - at discharge. Pt reports he has taken our diabetes classes in the past. Per review of our documents, pt visited with DTC in 2009, soon after dx of DM, otherwise has not had education since. Encouraged follow up for refresher education as pt feels appropriate. He declines scheduling at time of visit. Contact info provided. BG after surgery 180-225 mg/dl. Noted received one time dose steroid, likely contributing to hyperglycemia. Home medications to be resumed this evening. Will continue to follow as needed. Current hospital DM medication: Glipizide, 10 mg BID Metformin, 1000 mg, BID Actos, 30 mg daily Consult received for:  [x]             Assessment of home management 
              []      Medication Recommendations []             Meter/monitoring 
   []             Insulin instruction []             New diagnosis []             Outpatient education []             Insulin pump patient []             Insulin infusion 
   []             DKA/HHS Chart reviewed and initial evaluation complete on [facility]. Patient is a 79 y.o. male with known DM on Glipizide, 10 mg BID Metformin, 1000 mg, BID Actos, 30 mg daily at home. BG monitoring at home 1-2 times per week. Patient reports BG levels at home trend: stable. Assessed and instructed patient on the following:  
 interpretation of lab results, blood sugar goals, complications of diabetes mellitus, hypoglycemia prevention and treatment, nutrition Encouraged the following:  
· dietary modifications: plate method, avoidance of SSB/concentrated sweets, regular blood sugar monitorin time daily Provided patient with the following: [x]             Diabetes Self Care Guide []             Insulin education materials []             CHO counting education materials [x]             Outpatient DTC contact number 
             []             Glucometer Discussed with patient and/or family need for follow up appointment for diabetes management after discharge. A1c:  
No results found for: HBA1C, HGBE8, [id number] Recent Glucose Results:  
Lab Results Component Value Date/Time  (H) 2019 05:00 AM  
 GLUCPOC 207 (H) 2019 12:02 PM  
 GLUCPOC 183 (H) 2019 06:57 AM  
 GLUCPOC 224 (H) 2019 09:14 PM  
  
 
Lab Results Component Value Date/Time Creatinine 1.24 2019 05:00 AM  
 
Estimated Creatinine Clearance: 77.8 mL/min (based on SCr of 1.24 mg/dL). Active Orders Diet DIET REGULAR Low Fiber; No Conc. Sweets PO intake:  
Patient Vitals for the past 72 hrs: 
 % Diet Eaten 19 0907 100 % Will continue to follow as needed. Thank you. Edda Colon RD Time spent: 28 minutes

## 2019-04-23 NOTE — PROGRESS NOTES
Bedside shift change report given to Corinne, RN (oncoming nurse) by Rios Hussein RN (offgoing nurse). Report included the following information SBAR, Kardex, Procedure Summary, Intake/Output, MAR and Recent Results.

## 2019-04-23 NOTE — PROGRESS NOTES
Bedside shift change report given to Coby Jimenes (oncoming nurse) by Sonia Wharton RN (offgoing nurse). Report included the following information SBAR and Kardex.

## 2019-04-23 NOTE — PROGRESS NOTES
Spiritual Care Partner Volunteer visited patient in room 510/02 on 4.23.19. Documented by: : Rev. Nancy Villar. Ken Query; [facility], to contact [address] [address] call: [phone]

## 2019-04-23 NOTE — PROGRESS NOTES
Doing well. Pain controlled. Tolerating diet Visit Vitals /88 (BP 1 Location: Left arm, BP Patient Position: Sitting) Pulse 73 Temp 97.5 °F (36.4 °C) Resp 18 Ht 6' (1.829 m) Wt 118.3 kg (260 lb 14.4 oz) SpO2 94% BMI 35.38 kg/m² Date 04/22/19 0700 - 04/23/19 1866 04/23/19 0700 - 04/24/19 3470 Shift 0700-1859 1900-0659 24 Hour Total 8817-8479 3239-8815 24 Hour Total  
INTAKE  
P.O.    240  240  
  P. O.    240  240  
I. V.(mL/kg/hr) 600(0.5)  600(0.2) Volume (0.9% sodium chloride infusion) 200  200 Volume (0.9% sodium chloride infusion) 400  400 Shift Total(mL/kg) 600(5.6)  600(5.6) 240(2)  240(2) OUTPUT Urine(mL/kg/hr) 350(0.3) 2150(1.7) 2500(1) 375  375 Urine Voided    75  75 Urine Output 300  300 Urine Output (mL) ([REMOVED] Urinary Catheter 04/22/19 Busch) 50 2150 2200 300  300 Blood 50  50 Estimated Blood Loss 50  50 Shift Total(mL/kg) 400(3.7) 8488(12.9) 7441(65.1) 375(3.2)  375(3.2)  -2150 -1950 -135  -135 Weight (kg) 107 107 107 118.3 118.3 118.3  
 
abd soft A/P waiting for bowel function Continue current management 
ambulate

## 2019-04-23 NOTE — PROGRESS NOTES
Care Management Interventions PCP Verified by CM: Yes MyChart Signup: No 
Discharge Durable Medical Equipment: No 
Health Maintenance Reviewed: Yes Physical Therapy Consult: No 
Occupational Therapy Consult: No 
Speech Therapy Consult: No 
Current Support Network: Lives with Spouse Confirm Follow Up Transport: Family Plan discussed with Pt/Family/Caregiver: Yes Freedom of Choice Offered: Yes [employer] Information Provided?: No 
Discharge Location Discharge Placement: Home with family assistance Reason for Admission:   Robotic assisted right hemicolectomy RRAT Score:        7 Plan for utilizing home health:     TBD, most likely will not be needed Current Advanced Directive/Advance Care Plan: Wife, Seble Webber, is next of kin Likelihood of Readmission:  low Transition of Care Plan:      Cm met with patient and spouse at the bedside to explain role and offer support. Patient is alert and oriented x4, confirmed demographics. No CM needs anticipated at this time.  
Tre Park BSW, ACM

## 2019-04-23 NOTE — PROGRESS NOTES
Patient's family concerned about patient's home medications not being ordered for administration while in the hospital.  RN paged Dr. Roland Noble. Telephone order received from Dr. Roland Noble to restart patient's home doses of Glipizide, Lisinopril, Metformin, Metoprolol, and Actos.

## 2019-04-24 VITALS
HEART RATE: 69 BPM | DIASTOLIC BLOOD PRESSURE: 88 MMHG | HEIGHT: 72 IN | OXYGEN SATURATION: 95 % | RESPIRATION RATE: 18 BRPM | TEMPERATURE: 97.8 F | BODY MASS INDEX: 31.51 KG/M2 | WEIGHT: 232.6 LBS | SYSTOLIC BLOOD PRESSURE: 167 MMHG

## 2019-04-24 LAB
GLUCOSE BLD STRIP.AUTO-MCNC: 142 MG/DL (ref 65–100)
SERVICE CMNT-IMP: ABNORMAL

## 2019-04-24 PROCEDURE — 74011636637 HC RX REV CODE- 636/637: Performed by: COLON & RECTAL SURGERY

## 2019-04-24 PROCEDURE — 74011250637 HC RX REV CODE- 250/637: Performed by: COLON & RECTAL SURGERY

## 2019-04-24 PROCEDURE — 82962 GLUCOSE BLOOD TEST: CPT

## 2019-04-24 PROCEDURE — 74011250636 HC RX REV CODE- 250/636: Performed by: COLON & RECTAL SURGERY

## 2019-04-24 RX ORDER — OXYCODONE HYDROCHLORIDE 5 MG/1
5 TABLET ORAL
Qty: 20 TAB | Refills: 0 | Status: SHIPPED | OUTPATIENT
Start: 2019-04-24 | End: 2019-04-27

## 2019-04-24 RX ADMIN — ACETAMINOPHEN 1000 MG: 500 TABLET ORAL at 01:27

## 2019-04-24 RX ADMIN — CELECOXIB 100 MG: 100 CAPSULE ORAL at 09:19

## 2019-04-24 RX ADMIN — ENOXAPARIN SODIUM 40 MG: 100 INJECTION SUBCUTANEOUS at 06:51

## 2019-04-24 RX ADMIN — ACETAMINOPHEN 1000 MG: 500 TABLET ORAL at 07:02

## 2019-04-24 RX ADMIN — INSULIN LISPRO 2 UNITS: 100 INJECTION, SOLUTION INTRAVENOUS; SUBCUTANEOUS at 06:58

## 2019-04-24 RX ADMIN — METFORMIN HYDROCHLORIDE 1000 MG: 500 TABLET ORAL at 07:00

## 2019-04-24 RX ADMIN — Medication 10 ML: at 06:48

## 2019-04-24 RX ADMIN — GLIPIZIDE 10 MG: 5 TABLET ORAL at 09:19

## 2019-04-24 RX ADMIN — LISINOPRIL 40 MG: 20 TABLET ORAL at 09:19

## 2019-04-24 RX ADMIN — METOPROLOL SUCCINATE 50 MG: 50 TABLET, EXTENDED RELEASE ORAL at 09:19

## 2019-04-24 RX ADMIN — PIOGLITAZONE 30 MG: 15 TABLET ORAL at 09:19

## 2019-04-24 NOTE — DISCHARGE SUMMARY
Physician Discharge Summary     Patient ID:  Darrel Johns  [id number]  79 y.o.  1950    Allergies: Patient has no known allergies. Admit Date: 4/22/2019    Discharge Date: 4/24/2019    * Admission Diagnoses: Colon polyp [K63.5]    * Discharge Diagnoses:    Hospital Problems as of 4/24/2019 Date Reviewed: 4/22/2019          Codes Class Noted - Resolved POA    Colon polyp ICD-10-CM: K63.5  ICD-9-CM: 211.3  4/22/2019 - Present Unknown               Admission Condition: Good    * Discharge Condition: good    * Procedures: Procedure(s) with comments:  ROBOTIC ASSISTED RIGHT HEMICOLECTOMY          (E R A S) - spilled fecal matter      * Hospital Course:   Normal hospital course for this procedure. Consults: None    * Disposition: Home    Discharge Medications:   Current Discharge Medication List      START taking these medications    Details   oxyCODONE IR (ROXICODONE) 5 mg immediate release tablet Take 1 Tab by mouth every four (4) hours as needed for Pain for up to 3 days. Max Daily Amount: 30 mg.  Qty: 20 Tab, Refills: 0    Associated Diagnoses: S/P right hemicolectomy         CONTINUE these medications which have NOT CHANGED    Details   lisinopril (PRINIVIL, ZESTRIL) 40 mg tablet Take 1 Tab by mouth daily. Qty: 30 Tab, Refills: 5    Associated Diagnoses: Essential hypertension; Coronary artery disease involving native coronary artery of native heart without angina pectoris; Abnormal EKG; Mixed hyperlipidemia; Ischemic cardiomyopathy      metoprolol succinate (TOPROL-XL) 50 mg XL tablet Take 1 Tab by mouth daily. Qty: 30 Tab, Refills: 5    Associated Diagnoses: Coronary artery disease involving native coronary artery of native heart without angina pectoris; Abnormal EKG; Essential hypertension; Mixed hyperlipidemia; Ischemic cardiomyopathy      atorvastatin (LIPITOR) 20 mg tablet 20 mg nightly. glipiZIDE (GLUCOTROL) 10 mg tablet Take 10 mg by mouth two (2) times a day.       metFORMIN (GLUCOPHAGE) 500 mg tablet Take 1,000 mg by mouth two (2) times daily (with meals). pioglitazone (ACTOS) 30 mg tablet Take 30 mg by mouth daily. STOP taking these medications       aspirin delayed-release 81 mg tablet Comments:   Reason for Stopping:         omega 3-dha-epa-fish oil (FISH OIL) 100-160-1,000 mg cap Comments:   Reason for Stopping:               * Follow-up Care/Patient Instructions:   Activity: No lifting, Driving, or Strenuous exercise for 4 weeks  Diet: low fiber  Wound Care: Keep wound clean and dry and Ice to area for comfort    Follow-up Information     Follow up With Specialties Details Why [address] [place] [address], [address] [place] [address]  [phone]            Signed:  Kathy Estes  4/24/2019  10:11 AM

## 2019-04-24 NOTE — PROGRESS NOTES
General Daily Progress Note Admit Date: 4/22/2019 2 Days Post-Op Subjective:  
 
Doing well this morning. No complaints Pain well controlled Tolerating diet No nausea or vomiting. Is passing gas. Having BMs. No blood Objective:  
 
Patient Vitals for the past 24 hrs: 
 BP Temp Pulse Resp SpO2 Weight 04/24/19 0806 167/88 97.8 °F (36.6 °C) 69 18 95 %   
04/24/19 0718      105.5 kg (232 lb 9.6 oz) 04/24/19 0037 146/77 98 °F (36.7 °C) 66 16 93 %   
04/23/19 1925 164/87 98.1 °F (36.7 °C) 65 16 93 %   
04/23/19 1531 168/74 97.9 °F (36.6 °C) 72 17 94 %   
04/23/19 1235 163/88 97.5 °F (36.4 °C) 73 18 94 %  Patient Vitals for the past 8 hrs: 
 BP Temp Pulse Resp SpO2 Weight 04/24/19 0806 167/88 97.8 °F (36.6 °C) 69 18 95 %   
04/24/19 0718      105.5 kg (232 lb 9.6 oz) 04/24 0701 - 04/24 1900 In: 240 [P.O.:240] Out: 450 [Urine:450] 04/22 1901 - 04/24 0700 In: 540 [P.O.:540] Out: 3125 [CSVAP:2030] Physical Exam:  
Alert and oriented. No distress Abdomen soft and nontender. Incisions clean and dry Data Review Recent Results (from the past 24 hour(s)) GLUCOSE, POC Collection Time: 04/23/19 12:02 PM  
Result Value Ref Range Glucose (POC) 207 (H) 65 - 100 mg/dL Performed by Linda Solis GLUCOSE, POC Collection Time: 04/23/19  4:37 PM  
Result Value Ref Range Glucose (POC) 192 (H) 65 - 100 mg/dL Performed by Johnie Aaron GLUCOSE, POC Collection Time: 04/23/19  9:19 PM  
Result Value Ref Range Glucose (POC) 168 (H) 65 - 100 mg/dL Performed by Johnie Aaron GLUCOSE, POC Collection Time: 04/24/19  6:48 AM  
Result Value Ref Range Glucose (POC) 142 (H) 65 - 100 mg/dL Performed by Ida Melvin Assessment:  
 
Active Problems: 
  Colon polyp (4/22/2019) 2 Days Post-Op Plan:  
 
- continue diet 
- continue OOB 
- ok to go home Adina Garcia PA-C

## 2019-04-24 NOTE — DISCHARGE INSTRUCTIONS
Please call the office to schedule a follow up appointment. Talat Rueda MD, [address] [facility] Sabina Wilson MD, [address] [facility] Ana Rosa Long MD, FACS  Connie Click. Placido Kebede MD, MD Julia Hutson MD Orvis Downs, MD      Discharge Instructions for [facility] Colorectal Surgery Patients       1. Do not lift any objects weighing more than 10 pounds for 4 weeks. 2. Do not do any housework including vacuuming, scrubbing or yardwork for 4 weeks. 3. Do not drive for two weeks or while taking sedating medications. 4. You may walk as desired and go up and down stairs as needed. 5. You may shower. Do not take tub baths, swim or use hot tubs for 4 weeks. 6. Leave steri-strips on incision. They will fall off on their own. You may have dermabond on your incisions which is surgical glue. This will dissolve over time. Do not scrub around incisions. 7. Follow low-fiber diet for 2 weeks. (See handbook for additional details). 8. Drink nutritional supplements 2 times per day until your diet and appetite are back to normal. Diabetic patients should drink one-half bottle 4 times daily. 9. Multiple bowel movements are normal each day. Contact your surgeons office for any concerns. 10. Take Acetaminophen 1000mg every 6 hours for 24 hours, then as needed for pain, Ibuprofen 200-400 mg every 8 hours as needed for pain  and Oxycodone (per prescription instructions)    11. Follow up with providers as scheduled. 12. If your surgery involves an ostomy bag, please bring your supplies to the first office visit with your surgeon. 13. If you experience fever (greater than 100.5), chills, vomiting or redness or drainage at surgical site, please contact your surgeons office. 14. For questions regarding quality or quantity of ostomy output, refer to [facility] handbook or call surgeons office. Please see handbook for additional instructions.  If you have further questions, please call your surgeons office.

## 2019-05-24 DIAGNOSIS — E78.2 MIXED HYPERLIPIDEMIA: ICD-10-CM

## 2019-05-24 DIAGNOSIS — R94.31 ABNORMAL EKG: ICD-10-CM

## 2019-05-24 DIAGNOSIS — I25.5 ISCHEMIC CARDIOMYOPATHY: ICD-10-CM

## 2019-05-24 DIAGNOSIS — I25.10 CORONARY ARTERY DISEASE INVOLVING NATIVE CORONARY ARTERY OF NATIVE HEART WITHOUT ANGINA PECTORIS: ICD-10-CM

## 2019-05-24 DIAGNOSIS — I10 ESSENTIAL HYPERTENSION: ICD-10-CM

## 2019-05-24 RX ORDER — LISINOPRIL 40 MG/1
40 TABLET ORAL DAILY
Qty: 30 TAB | Refills: 11 | Status: SHIPPED | OUTPATIENT
Start: 2019-05-24 | End: 2020-06-08

## 2019-05-24 NOTE — TELEPHONE ENCOUNTER
Requested Prescriptions     Signed Prescriptions Disp Refills    lisinopril (PRINIVIL, ZESTRIL) 40 mg tablet 30 Tab 11     Sig: Take 1 Tab by mouth daily.      Authorizing Provider: Tonia Pichardo     Ordering User: Katherine Mary    Per Dr. Ashley Carlson verbal orders

## 2019-08-26 ENCOUNTER — OFFICE VISIT (OUTPATIENT)
Dept: CARDIOLOGY CLINIC | Age: 69
End: 2019-08-26

## 2019-08-26 VITALS
BODY MASS INDEX: 32.29 KG/M2 | WEIGHT: 238.4 LBS | SYSTOLIC BLOOD PRESSURE: 132 MMHG | HEART RATE: 66 BPM | DIASTOLIC BLOOD PRESSURE: 76 MMHG | RESPIRATION RATE: 16 BRPM | HEIGHT: 72 IN | OXYGEN SATURATION: 96 %

## 2019-08-26 DIAGNOSIS — I10 ESSENTIAL HYPERTENSION: ICD-10-CM

## 2019-08-26 DIAGNOSIS — I25.10 CORONARY ARTERY DISEASE INVOLVING NATIVE CORONARY ARTERY OF NATIVE HEART WITHOUT ANGINA PECTORIS: Primary | ICD-10-CM

## 2019-08-26 DIAGNOSIS — R94.31 ABNORMAL EKG: ICD-10-CM

## 2019-08-26 DIAGNOSIS — E78.2 MIXED HYPERLIPIDEMIA: ICD-10-CM

## 2019-08-26 NOTE — PROGRESS NOTES
HISTORY OF PRESENT ILLNESS  Maria M Cotter is a 81 y.o. male     SUMMARY:   Problem List  Date Reviewed: 8/26/2019          Codes Class Noted    Colon polyp ICD-10-CM: K63.5  ICD-9-CM: 211.3  4/22/2019        Encounter for screening colonoscopy ICD-10-CM: Z12.11  ICD-9-CM: V76.51  3/27/2019        Coronary artery disease involving native coronary artery without angina pectoris ICD-10-CM: I25.10  ICD-9-CM: 414.01  12/20/2018    Overview Signed 12/20/2018  4:32 PM by Omer Lala MD     2010 mcv, small heart attack with 4 stents to rca, lad  2013 mcv, abnormal stress test, stent to diagonal             Abnormal EKG ICD-10-CM: R94.31  ICD-9-CM: 794.31  12/20/2018        Essential hypertension ICD-10-CM: I10  ICD-9-CM: 401.9  12/20/2018        Mixed hyperlipidemia ICD-10-CM: E78.2  ICD-9-CM: 272.2  12/20/2018              Current Outpatient Medications on File Prior to Visit   Medication Sig    lisinopril (PRINIVIL, ZESTRIL) 40 mg tablet Take 1 Tab by mouth daily.  metoprolol succinate (TOPROL-XL) 50 mg XL tablet Take 1 Tab by mouth daily. (Patient taking differently: Take 50 mg by mouth every morning.)    atorvastatin (LIPITOR) 20 mg tablet 20 mg nightly.  glipiZIDE (GLUCOTROL) 10 mg tablet Take 10 mg by mouth two (2) times a day.  metFORMIN (GLUCOPHAGE) 500 mg tablet Take 1,000 mg by mouth two (2) times daily (with meals).  pioglitazone (ACTOS) 30 mg tablet Take 30 mg by mouth daily. No current facility-administered medications on file prior to visit. CARDIOLOGY STUDIES TO DATE:  2010 mcv, small heart attack with 4 stents to rca, lad  2013 mcv, abnormal stress test, stent to diagonal  1/19 echo lvef 43%  1/19 lexiscan cardiolyte with large fixed inferolateral defect, lvef 44%    Chief Complaint   Patient presents with    Coronary Artery Disease     HPI :  Mr. Anita Morrison is doing well. He is active, but not exercising and he has actually lost a couple of pounds since we last met.   No problem with his medication and Dr. Rut Concepcion is following his lipids and his sugars. CARDIAC ROS:   negative for chest pain, dyspnea, palpitations, syncope, orthopnea, paroxysmal nocturnal dyspnea, exertional chest pressure/discomfort, claudication, lower extremity edema    Family History   Problem Relation Age of Onset    Heart Attack Father         fatal, age 64    Alzheimer Mother     Hypertension Mother        Past Medical History:   Diagnosis Date    At risk for sleep apnea 03/20/2019    COSME 6     CAD (coronary artery disease)     Hyperlipidemia     Hypertension     MI (myocardial infarction) ([facility] [address] 2010    Type 2 diabetes mellitus ([facility]        GENERAL ROS:  A comprehensive review of systems was negative except for that written in the HPI. Visit Vitals  /76 (BP 1 Location: Left arm, BP Patient Position: Sitting)   Pulse 66   Resp 16   Ht 6' (1.829 m)   Wt 238 lb 6.4 oz (108.1 kg)   SpO2 96%   BMI 32.33 kg/m²       Wt Readings from Last 3 Encounters:   08/26/19 238 lb 6.4 oz (108.1 kg)   04/24/19 232 lb 9.6 oz (105.5 kg)   04/16/19 236 lb (107 kg)            BP Readings from Last 3 Encounters:   08/26/19 132/76   04/24/19 167/88   04/16/19 170/87       PHYSICAL EXAM  General appearance: alert, cooperative, no distress, appears stated age  Neurologic: Alert and oriented X 3  Neck: supple, symmetrical, trachea midline, no adenopathy, no carotid bruit and no JVD  Lungs: clear to auscultation bilaterally  Heart: regular rate and rhythm, S1, S2 normal, no murmur, click, rub or gallop  Extremities: extremities normal, atraumatic, no cyanosis or edema      ASSESSMENT  Mr. Ashley Mcdowell is stable and asymptomatic well compensated on a good medical regimen and needs no cardiac testing at this time.          current treatment plan is effective, no change in therapy  lab results and schedule of future lab studies reviewed with patient  reviewed diet, exercise and weight control    Encounter Diagnoses   Name Primary?  Coronary artery disease involving native coronary artery of native heart without angina pectoris Yes    Abnormal EKG     Essential hypertension     Mixed hyperlipidemia      No orders of the defined types were placed in this encounter. Follow-up and Dispositions    · Return in about 6 months (around 2/26/2020).          Mundo Sal MD  8/26/2019

## 2019-08-27 DIAGNOSIS — E78.2 MIXED HYPERLIPIDEMIA: ICD-10-CM

## 2019-08-27 DIAGNOSIS — I25.5 ISCHEMIC CARDIOMYOPATHY: ICD-10-CM

## 2019-08-27 DIAGNOSIS — R94.31 ABNORMAL EKG: ICD-10-CM

## 2019-08-27 DIAGNOSIS — I25.10 CORONARY ARTERY DISEASE INVOLVING NATIVE CORONARY ARTERY OF NATIVE HEART WITHOUT ANGINA PECTORIS: ICD-10-CM

## 2019-08-27 DIAGNOSIS — I10 ESSENTIAL HYPERTENSION: ICD-10-CM

## 2019-08-27 RX ORDER — METOPROLOL SUCCINATE 50 MG/1
TABLET, EXTENDED RELEASE ORAL
Qty: 30 TAB | Refills: 5 | Status: SHIPPED | OUTPATIENT
Start: 2019-08-27 | End: 2020-02-24

## 2019-09-20 PROBLEM — Z12.11 ENCOUNTER FOR SCREENING COLONOSCOPY: Status: RESOLVED | Noted: 2019-03-27 | Resolved: 2019-09-20

## 2019-11-26 ENCOUNTER — HOSPITAL ENCOUNTER (OUTPATIENT)
Dept: LAB | Age: 69
Discharge: HOME | End: 2019-11-26

## 2019-11-26 ENCOUNTER — OFFICE VISIT (OUTPATIENT)
Dept: NEUROLOGY | Age: 69
End: 2019-11-26

## 2019-11-26 VITALS
OXYGEN SATURATION: 95 % | WEIGHT: 240 LBS | BODY MASS INDEX: 32.51 KG/M2 | DIASTOLIC BLOOD PRESSURE: 92 MMHG | RESPIRATION RATE: 16 BRPM | SYSTOLIC BLOOD PRESSURE: 165 MMHG | HEIGHT: 72 IN | HEART RATE: 71 BPM

## 2019-11-26 DIAGNOSIS — Z86.73 OLD CEREBROVASCULAR ACCIDENT (CVA) WITHOUT LATE EFFECT: ICD-10-CM

## 2019-11-26 DIAGNOSIS — M47.12 CERVICAL SPONDYLOSIS WITH MYELOPATHY: ICD-10-CM

## 2019-11-26 DIAGNOSIS — Z86.73 OLD CEREBROVASCULAR ACCIDENT (CVA) WITHOUT LATE EFFECT: Primary | ICD-10-CM

## 2019-11-26 LAB
ASPIRIN TEST, ASPIRN: 386 ARU
COMMENT, HOLDF: NORMAL
P2Y12 PLT RESPONSE,PPPR: 180 PRU (ref 194–418)
SAMPLES BEING HELD,HOLD: NORMAL

## 2019-11-26 RX ORDER — CLOPIDOGREL BISULFATE 75 MG/1
TABLET ORAL
COMMUNITY

## 2019-11-26 RX ORDER — ASPIRIN 81 MG/1
TABLET ORAL DAILY
COMMUNITY
End: 2019-12-09

## 2019-11-26 RX ORDER — DIAZEPAM 5 MG/1
TABLET ORAL
Qty: 2 TAB | Refills: 0 | Status: SHIPPED | OUTPATIENT
Start: 2019-11-26 | End: 2019-12-09

## 2019-11-26 NOTE — PATIENT INSTRUCTIONS
PRESCRIPTION REFILL POLICY [facility] Statement to Patients April 1, 2014 In an effort to ensure the large volume of patient prescription refills is processed in the most efficient and expeditious manner, we are asking our patients to assist us by calling your Pharmacy for all prescription refills, this will include also your  Mail Order Pharmacy. The pharmacy will contact our office electronically to continue the refill process. Please do not wait until the last minute to call your pharmacy. We need at least 48 hours (2days) to fill prescriptions. We also encourage you to call your pharmacy before going to  your prescription to make sure it is ready. With regard to controlled substance prescription refill requests (narcotic refills) that need to be picked up at our office, we ask your cooperation by providing us with at least 72 hours (3days) notice that you will need a refill. We will not refill narcotic prescription refill requests after 4:00pm on any weekday, Monday through Thursday, or after 2:00pm on Fridays, or on the weekends. We encourage everyone to explore another way of getting your prescription refill request processed using [email or web address], our patient web portal through our electronic medical record system. [email or web address] is an efficient and effective way to communicate your medication request directly to the office and  downloadable as an dayron on your smart phone . [email or web address] also features a review functionality that allows you to view your medication list as well as leave messages for your physician. Are you ready to get connected? If so please review the attatched instructions or speak to any of our staff to get you set up right away! Thank you so much for your cooperation. Should you have any questions please contact our Practice Administrator. The Physicians and Staff,  [facility]

## 2019-11-26 NOTE — PROGRESS NOTES
Chief Complaint   Patient presents with    Neurologic Problem         HISTORY OF PRESENT ILLNESS  Alicia Patel is a 81 y.o. male with past medical history of hypertension, dyslipidemia, diabetes type 2 and coronary artery disease, status post multiple stents who was visiting his daughter in [place] last month. He developed difficulty with ambulation and was staggering. He was taken to a local hospital where he had extensive work-up. It revealed a small infarct in the paramedian, subcortical, medial frontal lobe. The symptoms gradually cleared up and he does not have any residual deficits. No previous history of stroke. His work-up was negative. He was taking aspirin 81 mg at the time and Plavix was added. Denies any new complaints. Past Medical History:   Diagnosis Date    At risk for sleep apnea 03/20/2019    COSME 6     CAD (coronary artery disease)     Hyperlipidemia     Hypertension     MI (myocardial infarction) ([facility] [address] 2010    Type 2 diabetes mellitus (HCC)      Current Outpatient Medications   Medication Sig    clopidogrel (PLAVIX) 75 mg tab Take  by mouth.  aspirin delayed-release 81 mg tablet Take  by mouth daily.  fish oil-omega-3 fatty acids 340-1,000 mg capsule Take 1 Cap by mouth daily.  diazePAM (VALIUM) 5 mg tablet Take 1 tab 1 hr prior to MRI, may repeat x1 if needed    metoprolol succinate (TOPROL-XL) 50 mg XL tablet TAKE 1 TABLET BY MOUTH ONCE DAILY    lisinopril (PRINIVIL, ZESTRIL) 40 mg tablet Take 1 Tab by mouth daily.  atorvastatin (LIPITOR) 20 mg tablet 40 mg nightly.  glipiZIDE (GLUCOTROL) 10 mg tablet Take 10 mg by mouth two (2) times a day.  metFORMIN (GLUCOPHAGE) 500 mg tablet Take 1,000 mg by mouth two (2) times daily (with meals).  pioglitazone (ACTOS) 30 mg tablet Take 30 mg by mouth daily. No current facility-administered medications for this visit.       No Known Allergies  Family History   Problem Relation Age of Onset    Heart Attack Father         fatal, age 64    Alzheimer Mother     Hypertension Mother      Social History     Tobacco Use    Smoking status: Former Smoker     Last attempt to quit: 2010     Years since quittin.9    Smokeless tobacco: Never Used   Substance Use Topics    Alcohol use: Yes     Comment: occassional 2 x year    Drug use: No     Past Surgical History:   Procedure Laterality Date    COLONOSCOPY N/A 3/27/2019    COLONOSCOPY performed by Kelvin Montenegro [facility] at [facility] AMBULAT[facility] OR    COLONOSCOPY N/A 2019    COLONOSCOPY performed by Eda Farley MD at [address] [place] [address] GI      COLONOSCOPY    [address] [facility] [address]  2010    5 stents         REVIEW OF SYSTEMS  Review of Systems - History obtained from the patient  Psychological ROS: negative  ENT ROS: negative  Hematological and Lymphatic ROS: negative  Endocrine ROS: negative  Respiratory ROS: no cough, shortness of breath, or wheezing  Cardiovascular ROS: no chest pain or dyspnea on exertion  Gastrointestinal ROS: no abdominal pain, change in bowel habits, or black or bloody stools  Genito-Urinary ROS: no dysuria, trouble voiding, or hematuria  Musculoskeletal ROS: negative  Dermatological ROS: negative      PHYSICAL EXAMINATION:    Visit Vitals  BP (!) 165/92   Pulse 71   Resp 16   Ht 6' (1.829 m)   Wt 108.9 kg (240 lb)   SpO2 95%   BMI 32.55 kg/m²     General:  Well nourished, and groomed individual in no acute distress. Neck: Supple, nontender, thyroid within normal limits, no JVD, no bruits, no pain with resistance to active range of motion. Heart: Regular rate and rhythm, no murmurs, rub, or gallop. Normal S1S2. Lungs:  Clear to auscultation bilaterally with equal chest expansion, no cough, no wheeze  Musculoskeletal:  Extremities revealed no edema and had full range of motion of joints. Psych:  Good mood and bright affect    NEUROLOGICAL EXAMINATION:     Mental Status:   Alert and oriented to person, place, and time. Attention span and concentration are normal. Speech is fluent. Cranial Nerves:    II, III, IV, VI:  Visual acuity grossly intact. Visual fields are normal.    Pupils are equal, round, and reactive to light and accommodation. Extra-ocular movements are full and fluid. Fundoscopic exam was benign, no ptosis or nystagmus. V-XII: Hearing is grossly intact. Facial features are symmetric, with normal sensation and strength. The palate rises symmetrically and the tongue protrudes midline. Sternocleidomastoids 5/5. Motor Examination: Normal tone, bulk, and strength. 5/5 muscle strength throughout. No cogwheel rigidity or clonus present. Sensory exam:  Normal throughout to pinprick, temperature, and vibration sense. Normal proprioception. Coordination: Patient had slight difficulty with Finger to nose and rapid arm movement testing was normal.   No resting or intention tremor    Gait and Station:  Steady. Had slight difficulty with tandem walking. Normal arm swing. No Rhomberg or pronator drift. No muscle wasting or fasiculations noted. Reflexes:  DTRs 2+ throughout. Toes downgoing. LABS / IMAGING    MRI scan of the brain showed a small subcortical, embolic appearing infarct as above    Echocardiogram was negative and did not show an intracardiac shunt    His last hemoglobin A1c was 7.0 per patient    Lipid panel was not available for review    ASSESSMENT    ICD-10-CM ICD-9-CM    1. Old cerebrovascular accident (CVA) without late effect Z86.73 V12.54 REFERRAL TO CARDIOLOGY      ASPIRIN TEST      PLATELET FUNCTION, VERIFY NOW P2Y12   2. Cervical spondylosis with myelopathy M47.12 721.1 MRI CERV SPINE WO CONT      diazePAM (VALIUM) 5 mg tablet       DISCUSSION  Mr. Brett Davies had a small, embolic appearing infarct in the left paramedian, subcortical region of frontal lobe. His work-up including CTA of the head and neck and echocardiogram was unremarkable.    Concern remains for proximal/central embolic source and I have recommended extended cardiac monitoring with loop recorder to rule out  paroxysmal atrial fibrillation. Referral was provided to go back to his primary cardiologist  Is currently on the dual antiplatelet therapy but no significant intracranial atherosclerotic vascular disease was seen  We will check aspirin test and P2Y12, will likely discontinue one of the agents depending upon test results as long-term DAPT is not indicated in the absence of significant atherosclerotic vascular disease  Continue atorvastatin  Stroke risk factors in their strict control i.e. control of diabetes, hypertension and dyslipidemia was discussed  He was encouraged to adopt a regular exercise regimen  There was incidentally seen moderate to severe spinal stenosis in the C3-C4 region which could potentially be causing myelomalacia/myelopathy causing gait and steadiness.   We will evaluate this further with MRI of the cervical spine      Jessie Wynn MD  Diplomate, American Board of Psychiatry & Neurology (Neurology)  [place] Anderson Board of Psychiatry & Neurology (Clinical Neurophysiology)  Diplomate, American Board of Electrodiagnostic Medicine

## 2019-11-26 NOTE — PROGRESS NOTES
Mr. Desire Salas presents as a new patient status post CVA 10/23/19. Depression screening done on this patient.

## 2019-11-26 NOTE — PROGRESS NOTES
Please inform that both aspirin and Plavix tests were therapeutic. He does not need to continue both and may stop aspirin, just continue with Plavix.

## 2019-11-27 ENCOUNTER — DOCUMENTATION ONLY (OUTPATIENT)
Dept: NEUROLOGY | Age: 69
End: 2019-11-27

## 2019-12-09 ENCOUNTER — CLINICAL SUPPORT (OUTPATIENT)
Dept: CARDIOLOGY CLINIC | Age: 69
End: 2019-12-09

## 2019-12-09 ENCOUNTER — OFFICE VISIT (OUTPATIENT)
Dept: CARDIOLOGY CLINIC | Age: 69
End: 2019-12-09

## 2019-12-09 VITALS
HEART RATE: 70 BPM | DIASTOLIC BLOOD PRESSURE: 86 MMHG | WEIGHT: 241.6 LBS | RESPIRATION RATE: 16 BRPM | OXYGEN SATURATION: 98 % | HEIGHT: 72 IN | SYSTOLIC BLOOD PRESSURE: 138 MMHG | BODY MASS INDEX: 32.72 KG/M2

## 2019-12-09 DIAGNOSIS — I25.10 CORONARY ARTERY DISEASE INVOLVING NATIVE CORONARY ARTERY OF NATIVE HEART WITHOUT ANGINA PECTORIS: Primary | ICD-10-CM

## 2019-12-09 DIAGNOSIS — G45.9 TIA (TRANSIENT ISCHEMIC ATTACK): ICD-10-CM

## 2019-12-09 DIAGNOSIS — E78.2 MIXED HYPERLIPIDEMIA: ICD-10-CM

## 2019-12-09 DIAGNOSIS — I10 ESSENTIAL HYPERTENSION: ICD-10-CM

## 2019-12-09 DIAGNOSIS — I25.10 CORONARY ARTERY DISEASE INVOLVING NATIVE CORONARY ARTERY OF NATIVE HEART WITHOUT ANGINA PECTORIS: ICD-10-CM

## 2019-12-09 NOTE — PROGRESS NOTES
HISTORY OF PRESENT ILLNESS  David Palmer is a 81 y.o. male     SUMMARY:   Problem List  Date Reviewed: 12/9/2019          Codes Class Noted    Colon polyp ICD-10-CM: K63.5  ICD-9-CM: 211.3  4/22/2019        Coronary artery disease involving native coronary artery without angina pectoris ICD-10-CM: I25.10  ICD-9-CM: 414.01  12/20/2018    Overview Signed 12/20/2018  4:32 PM by Gloria Tsang MD     2010 mcv, small heart attack with 4 stents to rca, lad  2013 mcv, abnormal stress test, stent to diagonal             Abnormal EKG ICD-10-CM: R94.31  ICD-9-CM: 794.31  12/20/2018        Essential hypertension ICD-10-CM: I10  ICD-9-CM: 401.9  12/20/2018        Mixed hyperlipidemia ICD-10-CM: E78.2  ICD-9-CM: 272.2  12/20/2018              Current Outpatient Medications on File Prior to Visit   Medication Sig    clopidogrel (PLAVIX) 75 mg tab Take  by mouth.  fish oil-omega-3 fatty acids 340-1,000 mg capsule Take 1 Cap by mouth daily.  metoprolol succinate (TOPROL-XL) 50 mg XL tablet TAKE 1 TABLET BY MOUTH ONCE DAILY    lisinopril (PRINIVIL, ZESTRIL) 40 mg tablet Take 1 Tab by mouth daily.  atorvastatin (LIPITOR) 20 mg tablet 40 mg nightly.  glipiZIDE (GLUCOTROL) 10 mg tablet Take 10 mg by mouth two (2) times a day.  metFORMIN (GLUCOPHAGE) 500 mg tablet Take 1,000 mg by mouth two (2) times daily (with meals). No current facility-administered medications on file prior to visit. CARDIOLOGY STUDIES TO DATE:  2010 mcv, small heart attack with 4 stents to rca, lad  2013 mcv, abnormal stress test, stent to diagonal  1/19 echo lvef 43%  1/19 lexiscan cardiolyte with large fixed inferolateral defect, lvef 44%    Chief Complaint   Patient presents with    Coronary Artery Disease     HPI :  Mr. Jeff Sinclair had a spell of being off balance and so forth a couple of weeks ago when he was visiting his daughter in [place].   He was submitted to a hospital and it turned out he had some small strokes in different vascular territories. The issue of an embolic source has come up. Since he has been home, he saw Dr. Lin Roberson and he is now just on Plavix. Blood pressure is under good control. No cardiac symptoms. CARDIAC ROS:   negative for chest pain, dyspnea, palpitations, syncope, orthopnea, paroxysmal nocturnal dyspnea, exertional chest pressure/discomfort, claudication, lower extremity edema    Family History   Problem Relation Age of Onset    Heart Attack Father         fatal, age 64    Alzheimer Mother     Hypertension Mother        Past Medical History:   Diagnosis Date    At risk for sleep apnea 03/20/2019    COSME 6     CAD (coronary artery disease)     Hyperlipidemia     Hypertension     MI (myocardial infarction) ([facility] [address] 2010    [facility]        GENERAL ROS:  A comprehensive review of systems was negative except for that written in the HPI. Visit Vitals  /86 (BP 1 Location: Right arm, BP Patient Position: Sitting)   Pulse 70   Resp 16   Ht 6' (1.829 m)   Wt 241 lb 9.6 oz (109.6 kg)   SpO2 98%   BMI 32.77 kg/m²       Wt Readings from Last 3 Encounters:   12/09/19 241 lb 9.6 oz (109.6 kg)   11/26/19 240 lb (108.9 kg)   08/26/19 238 lb 6.4 oz (108.1 kg)            BP Readings from Last 3 Encounters:   12/09/19 138/86   11/26/19 (!) 165/92   08/26/19 132/76       PHYSICAL EXAM  General appearance: alert, cooperative, no distress, appears stated age  Neurologic: Alert and oriented X 3  Neck: supple, symmetrical, trachea midline, no adenopathy, no carotid bruit and no JVD  Lungs: clear to auscultation bilaterally  Heart: regular rate and rhythm, S1, S2 normal, no murmur, click, rub or gallop  Extremities: extremities normal, atraumatic, no cyanosis or edema      ASSESSMENT  Mr. Ry Birmingham is stable and I think asymptomatic at this point on a reasonable medical regimen. We are going to put a 30 day event monitor on him to make sure he is not having any atrial fibrillation. current treatment plan is effective, no change in therapy  lab results and schedule of future lab studies reviewed with patient  reviewed diet, exercise and weight control    Encounter Diagnoses   Name Primary?  Coronary artery disease involving native coronary artery of native heart without angina pectoris Yes    Essential hypertension     Mixed hyperlipidemia     TIA (transient ischemic attack)      No orders of the defined types were placed in this encounter. Follow-up and Dispositions    · Return in about 6 months (around 6/9/2020).          Natalia Sparks MD  12/9/2019

## 2020-01-12 ENCOUNTER — TELEPHONE (OUTPATIENT)
Dept: CARDIOLOGY CLINIC | Age: 70
End: 2020-01-12

## 2020-01-12 NOTE — TELEPHONE ENCOUNTER
14 beat run of NSVT per [employer], auto-detect  No pt complaints  Called and spoke to patient -ID [id number]   He was asymptomatic afeeling fine  Told him to call with any symptoms

## 2020-01-13 NOTE — TELEPHONE ENCOUNTER
Dr. Fabio Zhao-  These event monitor reports are on your desk for review (preliminary). His f/u is scheduled for 2/24. Please advise if sooner f/u or anything else is needed.     Future Appointments   Date Time Provider Maggy Mendoza   2/24/2020  9:00 AM Farideh Zepeda  [address]

## 2020-01-14 ENCOUNTER — DOCUMENTATION ONLY (OUTPATIENT)
Dept: CARDIOLOGY CLINIC | Age: 70
End: 2020-01-14

## 2020-01-16 ENCOUNTER — TELEPHONE (OUTPATIENT)
Dept: CARDIOLOGY CLINIC | Age: 70
End: 2020-01-16

## 2020-01-16 NOTE — TELEPHONE ENCOUNTER
MD Lane Rodriguez, RN   Caller: Unspecified (Today, 11:39 AM)             Still some irregular heart beats, but nothing further. Will discuss further at [facility] patient. Verified patient's identity with two identifiers. Notified patient of results and that Dr. Nery Garsia said no need to do anything else at this time, but will discuss further at f/u. Patient verbalized understanding and denied further questions or concerns.

## 2020-01-16 NOTE — TELEPHONE ENCOUNTER
Dr. Antonio Kerns-    Patient's end of service event monitor report is on your desk for review.      Future Appointments   Date Time Provider Maggy Mendoza   2/24/2020  9:00 AM Natalia Oneill  [address]

## 2020-02-21 ENCOUNTER — HOSPITAL ENCOUNTER (OUTPATIENT)
Dept: MRI IMAGING | Age: 70
Discharge: HOME | End: 2020-02-21
Attending: PSYCHIATRY & NEUROLOGY
Payer: MEDICARE

## 2020-02-21 DIAGNOSIS — M47.12 CERVICAL SPONDYLOSIS WITH MYELOPATHY: ICD-10-CM

## 2020-02-21 PROCEDURE — 72141 MRI NECK SPINE W/O DYE: CPT

## 2020-02-23 DIAGNOSIS — E78.2 MIXED HYPERLIPIDEMIA: ICD-10-CM

## 2020-02-23 DIAGNOSIS — R94.31 ABNORMAL EKG: ICD-10-CM

## 2020-02-23 DIAGNOSIS — I25.5 ISCHEMIC CARDIOMYOPATHY: ICD-10-CM

## 2020-02-23 DIAGNOSIS — I10 ESSENTIAL HYPERTENSION: ICD-10-CM

## 2020-02-23 DIAGNOSIS — I25.10 CORONARY ARTERY DISEASE INVOLVING NATIVE CORONARY ARTERY OF NATIVE HEART WITHOUT ANGINA PECTORIS: ICD-10-CM

## 2020-02-24 ENCOUNTER — OFFICE VISIT (OUTPATIENT)
Dept: CARDIOLOGY CLINIC | Age: 70
End: 2020-02-24

## 2020-02-24 VITALS
OXYGEN SATURATION: 95 % | RESPIRATION RATE: 16 BRPM | HEIGHT: 72 IN | SYSTOLIC BLOOD PRESSURE: 138 MMHG | WEIGHT: 236.8 LBS | DIASTOLIC BLOOD PRESSURE: 82 MMHG | BODY MASS INDEX: 32.07 KG/M2 | HEART RATE: 76 BPM

## 2020-02-24 DIAGNOSIS — R94.31 ABNORMAL EKG: ICD-10-CM

## 2020-02-24 DIAGNOSIS — I25.5 ISCHEMIC CARDIOMYOPATHY: Primary | ICD-10-CM

## 2020-02-24 DIAGNOSIS — I10 ESSENTIAL HYPERTENSION: ICD-10-CM

## 2020-02-24 DIAGNOSIS — I25.10 CORONARY ARTERY DISEASE INVOLVING NATIVE CORONARY ARTERY OF NATIVE HEART WITHOUT ANGINA PECTORIS: ICD-10-CM

## 2020-02-24 DIAGNOSIS — E78.2 MIXED HYPERLIPIDEMIA: ICD-10-CM

## 2020-02-24 RX ORDER — METOPROLOL SUCCINATE 50 MG/1
TABLET, EXTENDED RELEASE ORAL
Qty: 30 TAB | Refills: 0 | Status: SHIPPED | OUTPATIENT
Start: 2020-02-24 | End: 2020-03-27

## 2020-02-24 NOTE — PROGRESS NOTES
HISTORY OF PRESENT ILLNESS  Suyapa Romo is a 81 y.o. male     SUMMARY:   Problem List  Date Reviewed: 2/24/2020          Codes Class Noted    Colon polyp ICD-10-CM: K63.5  ICD-9-CM: 211.3  4/22/2019        Coronary artery disease involving native coronary artery without angina pectoris ICD-10-CM: I25.10  ICD-9-CM: 414.01  12/20/2018    Overview Signed 12/20/2018  4:32 PM by Janessa Cuadra MD     2010 mcv, small heart attack with 4 stents to rca, lad  2013 mcv, abnormal stress test, stent to diagonal             Abnormal EKG ICD-10-CM: R94.31  ICD-9-CM: 794.31  12/20/2018        Essential hypertension ICD-10-CM: I10  ICD-9-CM: 401.9  12/20/2018        Mixed hyperlipidemia ICD-10-CM: E78.2  ICD-9-CM: 272.2  12/20/2018              Current Outpatient Medications on File Prior to Visit   Medication Sig    clopidogrel (PLAVIX) 75 mg tab Take  by mouth.  fish oil-omega-3 fatty acids 340-1,000 mg capsule Take 1 Cap by mouth daily.  metoprolol succinate (TOPROL-XL) 50 mg XL tablet TAKE 1 TABLET BY MOUTH ONCE DAILY    lisinopril (PRINIVIL, ZESTRIL) 40 mg tablet Take 1 Tab by mouth daily.  atorvastatin (LIPITOR) 20 mg tablet 40 mg nightly.  glipiZIDE (GLUCOTROL) 10 mg tablet Take 10 mg by mouth two (2) times a day.  metFORMIN (GLUCOPHAGE) 500 mg tablet Take 1,000 mg by mouth two (2) times daily (with meals). No current facility-administered medications on file prior to visit. CARDIOLOGY STUDIES TO DATE:  2010 mcv, small heart attack with 4 stents to rca, lad  2013 mcv, abnormal stress test, stent to diagonal  1/19 echo lvef 43%  1/19 lexiscan cardiolyte with large fixed inferolateral defect, lvef 44%  12/19 event monitor, several episodes of VT, longest 14beats    Chief Complaint   Patient presents with    Coronary Artery Disease     HPI :  Mr. Natanael Berger is doing okay. His event monitor showed some short runs of nonsustained VT, he was asymptomatic and he had no A. fib.   Continues to be active without any worrisome symptoms. His primary care is following his lipids and his diabetes. Blood pressure is under good control. CARDIAC ROS:   negative for chest pain, dyspnea, palpitations, syncope, orthopnea, paroxysmal nocturnal dyspnea, exertional chest pressure/discomfort, claudication, lower extremity edema    Family History   Problem Relation Age of Onset    Heart Attack Father         fatal, age 64    Alzheimer Mother     Hypertension Mother        Past Medical History:   Diagnosis Date    At risk for sleep apnea 03/20/2019    COSME 6     CAD (coronary artery disease)     [facility] [address]     Hyperlipidemia     Hypertension     MI (myocardial infarction) ([facility] [address] 2010    Type 2 diabetes mellitus ([facility] [address]        GENERAL ROS:  A comprehensive review of systems was negative except for that written in the HPI. Visit Vitals  /82 (BP 1 Location: Left arm, BP Patient Position: Sitting)   Pulse 76   Resp 16   Ht 6' (1.829 m)   Wt 236 lb 12.8 oz (107.4 kg)   SpO2 95%   BMI 32.12 kg/m²       Wt Readings from Last 3 Encounters:   02/24/20 236 lb 12.8 oz (107.4 kg)   12/09/19 241 lb 9.6 oz (109.6 kg)   11/26/19 240 lb (108.9 kg)            BP Readings from Last 3 Encounters:   02/24/20 138/82   12/09/19 138/86   11/26/19 (!) 165/92       PHYSICAL EXAM  General appearance: alert, cooperative, no distress, appears stated age  Neurologic: Alert and oriented X 3  Neck: supple, symmetrical, trachea midline, no adenopathy, no carotid bruit and no JVD  Lungs: clear to auscultation bilaterally  Heart: regular rate and rhythm, S1, S2 normal, no murmur, click, rub or gallop  Extremities: extremities normal, atraumatic, no cyanosis or edema      ASSESSMENT :      Mr. Gladis Adair is on a beta-blocker and his last ejection fraction was around 45% so I think his treatment is appropriate at this point. We are going to repeat his echo and continue his usual medications.   current treatment plan is effective, no change in therapy  lab results and schedule of future lab studies reviewed with patient  reviewed diet, exercise and weight control    Encounter Diagnoses   Name Primary?  Ischemic cardiomyopathy Yes    Coronary artery disease involving native coronary artery of native heart without angina pectoris     Abnormal EKG     Essential hypertension     Mixed hyperlipidemia      No orders of the defined types were placed in this encounter. Follow-up and Dispositions    · Return in about 6 months (around 8/24/2020). Tom Palmer MD  2/24/2020  Please note that this dictation was completed with [employer], the [employer] voice recognition software. Quite often unanticipated grammatical, syntax, homophones, and other interpretive errors are inadvertently transcribed by the computer software. Please disregard these errors. Please excuse any errors that have escaped final proofreading. Thank you.

## 2020-02-24 NOTE — PROGRESS NOTES
Please inform that MRI of the cervical spine showed mild to moderate degenerative changes but no significant nerve root or spinal cord compression. Nothing additional needs done at this time.

## 2020-02-25 ENCOUNTER — TELEPHONE (OUTPATIENT)
Dept: NEUROLOGY | Age: 70
End: 2020-02-25

## 2020-03-27 DIAGNOSIS — I10 ESSENTIAL HYPERTENSION: ICD-10-CM

## 2020-03-27 DIAGNOSIS — E78.2 MIXED HYPERLIPIDEMIA: ICD-10-CM

## 2020-03-27 DIAGNOSIS — I25.10 CORONARY ARTERY DISEASE INVOLVING NATIVE CORONARY ARTERY OF NATIVE HEART WITHOUT ANGINA PECTORIS: ICD-10-CM

## 2020-03-27 DIAGNOSIS — R94.31 ABNORMAL EKG: ICD-10-CM

## 2020-03-27 DIAGNOSIS — I25.5 ISCHEMIC CARDIOMYOPATHY: ICD-10-CM

## 2020-03-27 RX ORDER — METOPROLOL SUCCINATE 50 MG/1
TABLET, EXTENDED RELEASE ORAL
Qty: 30 TAB | Refills: 5 | Status: SHIPPED | OUTPATIENT
Start: 2020-03-27 | End: 2020-09-21

## 2020-06-06 DIAGNOSIS — I25.10 CORONARY ARTERY DISEASE INVOLVING NATIVE CORONARY ARTERY OF NATIVE HEART WITHOUT ANGINA PECTORIS: ICD-10-CM

## 2020-06-06 DIAGNOSIS — R94.31 ABNORMAL EKG: ICD-10-CM

## 2020-06-06 DIAGNOSIS — E78.2 MIXED HYPERLIPIDEMIA: ICD-10-CM

## 2020-06-06 DIAGNOSIS — I10 ESSENTIAL HYPERTENSION: ICD-10-CM

## 2020-06-06 DIAGNOSIS — I25.5 ISCHEMIC CARDIOMYOPATHY: ICD-10-CM

## 2020-06-08 RX ORDER — LISINOPRIL 40 MG/1
TABLET ORAL
Qty: 90 TAB | Refills: 3 | Status: SHIPPED | OUTPATIENT
Start: 2020-06-08 | End: 2021-06-02

## 2020-08-20 ENCOUNTER — TELEPHONE (OUTPATIENT)
Dept: CARDIOLOGY CLINIC | Age: 70
End: 2020-08-20

## 2020-08-20 NOTE — TELEPHONE ENCOUNTER
Left message for patient that Dr. Anabell Mcneal appointment on 8/28/20 has been cancelled to call back to reschedule. Patient has echo same day that he can keep or he can reschedule both to Dr. Agustín Hutchins next available.

## 2020-08-28 ENCOUNTER — TELEPHONE (OUTPATIENT)
Dept: CARDIOLOGY CLINIC | Age: 70
End: 2020-08-28

## 2020-08-28 ENCOUNTER — ANCILLARY PROCEDURE (OUTPATIENT)
Dept: CARDIOLOGY CLINIC | Age: 70
End: 2020-08-28
Payer: MEDICARE

## 2020-08-28 VITALS — WEIGHT: 220 LBS | HEIGHT: 72 IN | BODY MASS INDEX: 29.8 KG/M2

## 2020-08-28 DIAGNOSIS — I25.5 ISCHEMIC CARDIOMYOPATHY: ICD-10-CM

## 2020-08-28 LAB
ECHO AO ROOT DIAM: 3.68 CM
ECHO AV AREA PEAK VELOCITY: 3.17 CM2
ECHO AV AREA VTI: 4.41 CM2
ECHO AV AREA/BSA PEAK VELOCITY: 1.4 CM2/M2
ECHO AV AREA/BSA VTI: 2 CM2/M2
ECHO AV MEAN GRADIENT: 3.08 MMHG
ECHO AV PEAK GRADIENT: 6.66 MMHG
ECHO AV PEAK VELOCITY: 128.99 CM/S
ECHO AV VTI: 20.91 CM
ECHO LA AREA 4C: 21.67 CM2
ECHO LA MAJOR AXIS: 4.26 CM
ECHO LA MINOR AXIS: 1.92 CM
ECHO LA VOL 2C: 87.92 ML (ref 18–58)
ECHO LA VOL 4C: 56.89 ML (ref 18–58)
ECHO LA VOL BP: 79.02 ML (ref 18–58)
ECHO LA VOL/BSA BIPLANE: 35.62 ML/M2 (ref 16–28)
ECHO LA VOLUME INDEX A2C: 39.63 ML/M2 (ref 16–28)
ECHO LA VOLUME INDEX A4C: 25.64 ML/M2 (ref 16–28)
ECHO LV E' LATERAL VELOCITY: 9.23 CM/S
ECHO LV E' SEPTAL VELOCITY: 6.5 CM/S
ECHO LV EDV A2C: 110.66 ML
ECHO LV EDV A4C: 116.33 ML
ECHO LV EDV BP: 115.62 ML (ref 67–155)
ECHO LV EDV INDEX A4C: 52.4 ML/M2
ECHO LV EDV INDEX BP: 52.1 ML/M2
ECHO LV EDV NDEX A2C: 49.9 ML/M2
ECHO LV EJECTION FRACTION A2C: 58 PERCENT
ECHO LV EJECTION FRACTION A4C: 35 PERCENT
ECHO LV EJECTION FRACTION BIPLANE: 45.3 PERCENT (ref 55–100)
ECHO LV ESV A2C: 46.61 ML
ECHO LV ESV A4C: 75.96 ML
ECHO LV ESV BP: 63.23 ML (ref 22–58)
ECHO LV ESV INDEX A2C: 21 ML/M2
ECHO LV ESV INDEX A4C: 34.2 ML/M2
ECHO LV ESV INDEX BP: 28.5 ML/M2
ECHO LV INTERNAL DIMENSION DIASTOLIC: 6.2 CM (ref 4.2–5.9)
ECHO LV INTERNAL DIMENSION SYSTOLIC: 4.8 CM
ECHO LV IVSD: 1.2 CM (ref 0.6–1)
ECHO LV MASS 2D: 338.9 G (ref 88–224)
ECHO LV MASS INDEX 2D: 152.8 G/M2 (ref 49–115)
ECHO LV POSTERIOR WALL DIASTOLIC: 1.24 CM (ref 0.6–1)
ECHO LVOT DIAM: 2.39 CM
ECHO LVOT PEAK GRADIENT: 3.32 MMHG
ECHO LVOT PEAK VELOCITY: 91.06 CM/S
ECHO LVOT SV: 92.2 ML
ECHO LVOT VTI: 20.51 CM
ECHO MV A VELOCITY: 80.49 CM/S
ECHO MV E DECELERATION TIME (DT): 0.29 S
ECHO MV E VELOCITY: 71.89 CM/S
ECHO MV E/A RATIO: 0.89
ECHO MV E/E' LATERAL: 7.79
ECHO MV E/E' RATIO (AVERAGED): 9.42
ECHO MV E/E' SEPTAL: 11.06
ECHO PV MAX VELOCITY: 113.42 CM/S
ECHO PV PEAK INSTANTANEOUS GRADIENT SYSTOLIC: 5.15 MMHG
ECHO RV TAPSE: 2.13 CM (ref 1.5–2)
ECHO TV REGURGITANT MAX VELOCITY: 237.95 CM/S
ECHO TV REGURGITANT PEAK GRADIENT: 22.65 MMHG
LA VOL DISK BP: 72.18 ML (ref 18–58)

## 2020-08-28 PROCEDURE — 93306 TTE W/DOPPLER COMPLETE: CPT | Performed by: SPECIALIST

## 2020-08-28 NOTE — TELEPHONE ENCOUNTER
Called patient. Verified patient's identity with two identifiers. I said I know he has f/u scheduled for Monday, but wanted to call with results. Notified patient of results and Dr. Vanesa Laughlin message. Patient will keep scheduled appointment and denied further questions or concerns.      Future Appointments   Date Time Provider Maggy Mendoza   8/31/2020  9:20 AM MD JENSEN Allen AMB

## 2020-08-28 NOTE — TELEPHONE ENCOUNTER
----- Message from Josselyn Rand MD sent at 8/28/2020 12:17 PM EDT -----  Heart muscle is a little weak, same as before. Valves all good.  Overall looks good, stable

## 2020-08-31 ENCOUNTER — OFFICE VISIT (OUTPATIENT)
Dept: CARDIOLOGY CLINIC | Age: 70
End: 2020-08-31
Payer: MEDICARE

## 2020-08-31 VITALS
WEIGHT: 220 LBS | HEIGHT: 72 IN | SYSTOLIC BLOOD PRESSURE: 144 MMHG | BODY MASS INDEX: 29.8 KG/M2 | DIASTOLIC BLOOD PRESSURE: 80 MMHG | OXYGEN SATURATION: 97 % | HEART RATE: 71 BPM

## 2020-08-31 DIAGNOSIS — I10 ESSENTIAL HYPERTENSION: ICD-10-CM

## 2020-08-31 DIAGNOSIS — I25.10 CORONARY ARTERY DISEASE INVOLVING NATIVE CORONARY ARTERY OF NATIVE HEART WITHOUT ANGINA PECTORIS: Primary | ICD-10-CM

## 2020-08-31 DIAGNOSIS — E78.2 MIXED HYPERLIPIDEMIA: ICD-10-CM

## 2020-08-31 DIAGNOSIS — I25.5 ISCHEMIC CARDIOMYOPATHY: ICD-10-CM

## 2020-08-31 PROCEDURE — 99213 OFFICE O/P EST LOW 20 MIN: CPT | Performed by: SPECIALIST

## 2020-08-31 PROCEDURE — G8753 SYS BP > OR = 140: HCPCS | Performed by: SPECIALIST

## 2020-08-31 PROCEDURE — G8536 NO DOC ELDER MAL SCRN: HCPCS | Performed by: SPECIALIST

## 2020-08-31 PROCEDURE — G8427 DOCREV CUR MEDS BY ELIG CLIN: HCPCS | Performed by: SPECIALIST

## 2020-08-31 PROCEDURE — G8417 CALC BMI ABV UP PARAM F/U: HCPCS | Performed by: SPECIALIST

## 2020-08-31 PROCEDURE — 3017F COLORECTAL CA SCREEN DOC REV: CPT | Performed by: SPECIALIST

## 2020-08-31 PROCEDURE — G8754 DIAS BP LESS 90: HCPCS | Performed by: SPECIALIST

## 2020-08-31 PROCEDURE — G0463 HOSPITAL OUTPT CLINIC VISIT: HCPCS | Performed by: SPECIALIST

## 2020-08-31 PROCEDURE — G8432 DEP SCR NOT DOC, RNG: HCPCS | Performed by: SPECIALIST

## 2020-08-31 PROCEDURE — 1101F PT FALLS ASSESS-DOCD LE1/YR: CPT | Performed by: SPECIALIST

## 2020-08-31 RX ORDER — AMLODIPINE BESYLATE 5 MG/1
5 TABLET ORAL DAILY
Qty: 30 TAB | Refills: 1 | Status: SHIPPED | OUTPATIENT
Start: 2020-08-31 | End: 2020-10-30

## 2020-08-31 NOTE — PROGRESS NOTES
HISTORY OF PRESENT ILLNESS  Shreya Glaser is a 78 y.o. male     SUMMARY:   Problem List  Date Reviewed: 8/31/2020          Codes Class Noted    Colon polyp ICD-10-CM: K63.5  ICD-9-CM: 211.3  4/22/2019        Coronary artery disease involving native coronary artery without angina pectoris ICD-10-CM: I25.10  ICD-9-CM: 414.01  12/20/2018    Overview Signed 12/20/2018  4:32 PM by Keerthi Foster MD     2010 mcv, small heart attack with 4 stents to rca, lad  2013 mcv, abnormal stress test, stent to diagonal             Abnormal EKG ICD-10-CM: R94.31  ICD-9-CM: 794.31  12/20/2018        Essential hypertension ICD-10-CM: I10  ICD-9-CM: 401.9  12/20/2018        Mixed hyperlipidemia ICD-10-CM: E78.2  ICD-9-CM: 272.2  12/20/2018              Current Outpatient Medications on File Prior to Visit   Medication Sig    lisinopriL (PRINIVIL, ZESTRIL) 40 mg tablet Take 1 tablet by mouth once daily    metoprolol succinate (TOPROL-XL) 50 mg XL tablet Take 1 tablet by mouth once daily    clopidogrel (PLAVIX) 75 mg tab Take  by mouth.  fish oil-omega-3 fatty acids 340-1,000 mg capsule Take 1 Cap by mouth daily.  glipiZIDE (GLUCOTROL) 10 mg tablet Take 10 mg by mouth two (2) times a day.  metFORMIN (GLUCOPHAGE) 500 mg tablet Take 1,000 mg by mouth two (2) times daily (with meals).  atorvastatin (LIPITOR) 20 mg tablet 40 mg nightly. No current facility-administered medications on file prior to visit. CARDIOLOGY STUDIES TO DATE:  2010 mcv, small heart attack with 4 stents to rca, lad  2013 mcv, abnormal stress test, stent to diagonal  1/19 echo lvef 43%  1/19 lexiscan cardiolyte with large fixed inferolateral defect, lvef 44%  12/19 event monitor, several episodes of VT, longest 14beats   08/28/20 echo lvef 45%, lae    Chief Complaint   Patient presents with    Follow-up     HPI :  He is doing well.   He is very active around his house and home with no worrisome symptoms or problems with his medications. His recent echo showed stable LV function. His primary care is following his lipids and his diabetes. He is lost about 16 pounds since we last met. Initial blood pressure was elevated slightly. CARDIAC ROS:   negative for chest pain, dyspnea, palpitations, syncope, orthopnea, paroxysmal nocturnal dyspnea, exertional chest pressure/discomfort, claudication, lower extremity edema    Family History   Problem Relation Age of Onset    Heart Attack Father         fatal, age 64    Alzheimer Mother     Hypertension Mother        Past Medical History:   Diagnosis Date    At risk for sleep apnea 03/20/2019    COSME 6     CAD (coronary artery disease)     [facility] [address]     Hyperlipidemia     Hypertension     MI (myocardial infarction) ([facility] [address] 2010    [facility]        GENERAL ROS:  A comprehensive review of systems was negative except for that written in the HPI. Visit Vitals  /82   Pulse 71   Ht 6' (1.829 m)   Wt 220 lb (99.8 kg)   SpO2 97%   BMI 29.84 kg/m²       Wt Readings from Last 3 Encounters:   08/31/20 220 lb (99.8 kg)   08/28/20 220 lb (99.8 kg)   02/24/20 236 lb 12.8 oz (107.4 kg)            BP Readings from Last 3 Encounters:   08/31/20 144/82   02/24/20 138/82   12/09/19 138/86       PHYSICAL EXAM  General appearance: alert, cooperative, no distress, appears stated age  Neurologic: Alert and oriented X 3  Neck: supple, symmetrical, trachea midline, no adenopathy, no carotid bruit and no JVD  Lungs: clear to auscultation bilaterally  Heart: regular rate and rhythm, S1, S2 normal, no murmur, click, rub or gallop  Extremities: extremities normal, atraumatic, no cyanosis or edema      ASSESSMENT :      He is stable and asymptomatic, well compensated on a good medical regimen and needs no cardiac testing at this time. We will recheck his blood pressure. We will get a copy of his recent lab work.   current treatment plan is effective, no change in therapy  lab results and schedule of future lab studies reviewed with patient  reviewed diet, exercise and weight control    Encounter Diagnoses   Name Primary?  Coronary artery disease involving native coronary artery of native heart without angina pectoris Yes    Ischemic cardiomyopathy     Essential hypertension     Mixed hyperlipidemia      No orders of the defined types were placed in this encounter. Follow-up and Dispositions    · Return in about 6 months (around 2/28/2021). Debbie Fam MD  8/31/2020  Please note that this dictation was completed with [employer], the computer voice recognition software. Quite often unanticipated grammatical, syntax, homophones, and other interpretive errors are inadvertently transcribed by the computer software. Please disregard these errors. Please excuse any errors that have escaped final proofreading. Thank you.

## 2020-08-31 NOTE — ADDENDUM NOTE
Addended by: [facility] on: 8/31/2020 10:18 AM     Modules accepted: Orders

## 2020-09-10 ENCOUNTER — CLINICAL SUPPORT (OUTPATIENT)
Dept: CARDIOLOGY CLINIC | Age: 70
End: 2020-09-10

## 2020-09-10 VITALS — SYSTOLIC BLOOD PRESSURE: 132 MMHG | DIASTOLIC BLOOD PRESSURE: 70 MMHG

## 2020-09-10 DIAGNOSIS — I10 ESSENTIAL HYPERTENSION: Primary | ICD-10-CM

## 2020-09-10 NOTE — PROGRESS NOTES
Patient in for BP check only. He started amlodipine 5 mg daily last office visit. Patient reported BP today at home was 129/63. I checked it manually in office and /70. Advised patient to continue medications and monitoring BP at home. He will call if anything changes. Patient verbalized understanding and denied further questions or concerns.

## 2020-09-20 DIAGNOSIS — I25.5 ISCHEMIC CARDIOMYOPATHY: ICD-10-CM

## 2020-09-20 DIAGNOSIS — E78.2 MIXED HYPERLIPIDEMIA: ICD-10-CM

## 2020-09-20 DIAGNOSIS — I25.10 CORONARY ARTERY DISEASE INVOLVING NATIVE CORONARY ARTERY OF NATIVE HEART WITHOUT ANGINA PECTORIS: ICD-10-CM

## 2020-09-20 DIAGNOSIS — R94.31 ABNORMAL EKG: ICD-10-CM

## 2020-09-20 DIAGNOSIS — I10 ESSENTIAL HYPERTENSION: ICD-10-CM

## 2020-09-21 RX ORDER — METOPROLOL SUCCINATE 50 MG/1
TABLET, EXTENDED RELEASE ORAL
Qty: 30 TAB | Refills: 5 | Status: SHIPPED | OUTPATIENT
Start: 2020-09-21 | End: 2021-03-29

## 2020-09-21 NOTE — TELEPHONE ENCOUNTER
Requested Prescriptions     Signed Prescriptions Disp Refills    metoprolol succinate (TOPROL-XL) 50 mg XL tablet 30 Tab 5     Sig: Take 1 tablet by mouth once daily     Authorizing Provider: Bianca Herzog     Ordering User: Iris Spain    Per Dr. Daphnie Mar verbal orders

## 2020-10-13 NOTE — ANESTHESIA POSTPROCEDURE EVALUATION
No return call back from pt. Pt notified via my chart message. Post-Anesthesia Evaluation and Assessment Patient: Ramya Reyes MRN: [id number]  SSN: [id number] YOB: 1950  Age: 79 y.o. Sex: male I have evaluated the patient and they are stable and ready for discharge from the PACU. Cardiovascular Function/Vital Signs Visit Vitals /84 Pulse 69 Temp 36.6 °C (97.9 °F) Resp 20 SpO2 94% Patient is status post MAC anesthesia for Procedure(s): 
COLONOSCOPY INJECTION 
ENDOSCOPIC POLYPECTOMY. Nausea/Vomiting: None Postoperative hydration reviewed and adequate. Pain: 
Pain Scale 1: Numeric (0 - 10) (04/22/19 0841) Pain Intensity 1: 0 (04/22/19 0841) Managed Neurological Status: At baseline Mental Status, Level of Consciousness: Alert and  oriented to person, place, and time Pulmonary Status:  
O2 Device: Room air (04/22/19 0841) Adequate oxygenation and airway patent Complications related to anesthesia: None Post-anesthesia assessment completed. No concerns Signed By: Kayleigh Devi MD   
 April 22, 2019 Procedure(s): 
COLONOSCOPY INJECTION 
ENDOSCOPIC POLYPECTOMY. MAC 
 
<BSHSIANPOST> Vitals Value Taken Time  
BP 0/0 4/22/2019  8:50 AM  
Temp 36.6 °C (97.9 °F) 4/22/2019  8:33 AM  
Pulse 0 4/22/2019  8:50 AM  
Resp 0 4/22/2019  8:54 AM  
SpO2 96 % 4/22/2019  8:48 AM  
Vitals shown include unvalidated device data.

## 2020-10-30 DIAGNOSIS — E78.2 MIXED HYPERLIPIDEMIA: ICD-10-CM

## 2020-10-30 DIAGNOSIS — I25.5 ISCHEMIC CARDIOMYOPATHY: ICD-10-CM

## 2020-10-30 DIAGNOSIS — I25.10 CORONARY ARTERY DISEASE INVOLVING NATIVE CORONARY ARTERY OF NATIVE HEART WITHOUT ANGINA PECTORIS: ICD-10-CM

## 2020-10-30 DIAGNOSIS — I10 ESSENTIAL HYPERTENSION: ICD-10-CM

## 2020-10-30 RX ORDER — AMLODIPINE BESYLATE 5 MG/1
TABLET ORAL
Qty: 30 TAB | Refills: 5 | Status: SHIPPED | OUTPATIENT
Start: 2020-10-30 | End: 2021-04-26

## 2020-10-30 NOTE — TELEPHONE ENCOUNTER
Requested Prescriptions     Signed Prescriptions Disp Refills    amLODIPine (NORVASC) 5 mg tablet 30 Tab 5     Sig: Take 1 tablet by mouth once daily     Authorizing Provider: Karina Rodriguez     Ordering User: Eboni Santana    Per Dr. Carol Ward verbal orders

## 2021-01-01 NOTE — TELEPHONE ENCOUNTER
Requested Prescriptions     Signed Prescriptions Disp Refills    lisinopriL (PRINIVIL, ZESTRIL) 40 mg tablet 90 Tab 3     Sig: Take 1 tablet by mouth once daily     Authorizing Provider: Carolina Lay     Ordering User: Josep Johnson    Per Dr. rPesley Dia verbal orders Statement Selected

## 2021-03-17 ENCOUNTER — OFFICE VISIT (OUTPATIENT)
Dept: CARDIOLOGY CLINIC | Age: 71
End: 2021-03-17
Payer: MEDICARE

## 2021-03-17 VITALS
OXYGEN SATURATION: 94 % | WEIGHT: 231 LBS | BODY MASS INDEX: 31.29 KG/M2 | HEART RATE: 68 BPM | RESPIRATION RATE: 18 BRPM | SYSTOLIC BLOOD PRESSURE: 140 MMHG | HEIGHT: 72 IN | DIASTOLIC BLOOD PRESSURE: 80 MMHG

## 2021-03-17 DIAGNOSIS — I25.10 CORONARY ARTERY DISEASE INVOLVING NATIVE CORONARY ARTERY OF NATIVE HEART WITHOUT ANGINA PECTORIS: Primary | ICD-10-CM

## 2021-03-17 DIAGNOSIS — E78.2 MIXED HYPERLIPIDEMIA: ICD-10-CM

## 2021-03-17 DIAGNOSIS — I25.5 ISCHEMIC CARDIOMYOPATHY: ICD-10-CM

## 2021-03-17 DIAGNOSIS — I10 ESSENTIAL HYPERTENSION: ICD-10-CM

## 2021-03-17 PROCEDURE — G8427 DOCREV CUR MEDS BY ELIG CLIN: HCPCS | Performed by: SPECIALIST

## 2021-03-17 PROCEDURE — G8417 CALC BMI ABV UP PARAM F/U: HCPCS | Performed by: SPECIALIST

## 2021-03-17 PROCEDURE — G0463 HOSPITAL OUTPT CLINIC VISIT: HCPCS | Performed by: SPECIALIST

## 2021-03-17 PROCEDURE — 99213 OFFICE O/P EST LOW 20 MIN: CPT | Performed by: SPECIALIST

## 2021-03-17 PROCEDURE — 1101F PT FALLS ASSESS-DOCD LE1/YR: CPT | Performed by: SPECIALIST

## 2021-03-17 PROCEDURE — G8536 NO DOC ELDER MAL SCRN: HCPCS | Performed by: SPECIALIST

## 2021-03-17 PROCEDURE — G8753 SYS BP > OR = 140: HCPCS | Performed by: SPECIALIST

## 2021-03-17 PROCEDURE — 3017F COLORECTAL CA SCREEN DOC REV: CPT | Performed by: SPECIALIST

## 2021-03-17 PROCEDURE — G8754 DIAS BP LESS 90: HCPCS | Performed by: SPECIALIST

## 2021-03-17 PROCEDURE — G8432 DEP SCR NOT DOC, RNG: HCPCS | Performed by: SPECIALIST

## 2021-03-17 NOTE — PROGRESS NOTES
HISTORY OF PRESENT ILLNESS  Alireza Gusman is a 78 y.o. male     SUMMARY:   Problem List  Date Reviewed: 3/17/2021          Codes Class Noted    Colon polyp ICD-10-CM: K63.5  ICD-9-CM: 211.3  4/22/2019        Coronary artery disease involving native coronary artery without angina pectoris ICD-10-CM: I25.10  ICD-9-CM: 414.01  12/20/2018    Overview Signed 12/20/2018  4:32 PM by Elham Levy MD     2010 mcv, small heart attack with 4 stents to rca, lad  2013 mcv, abnormal stress test, stent to diagonal             Abnormal EKG ICD-10-CM: R94.31  ICD-9-CM: 794.31  12/20/2018        Essential hypertension ICD-10-CM: I10  ICD-9-CM: 401.9  12/20/2018        Mixed hyperlipidemia ICD-10-CM: E78.2  ICD-9-CM: 272.2  12/20/2018              Current Outpatient Medications on File Prior to Visit   Medication Sig    amLODIPine (NORVASC) 5 mg tablet Take 1 tablet by mouth once daily    metoprolol succinate (TOPROL-XL) 50 mg XL tablet Take 1 tablet by mouth once daily    lisinopriL (PRINIVIL, ZESTRIL) 40 mg tablet Take 1 tablet by mouth once daily    clopidogrel (PLAVIX) 75 mg tab Take  by mouth.  atorvastatin (LIPITOR) 20 mg tablet Take 40 mg by mouth nightly.  glipiZIDE (GLUCOTROL) 10 mg tablet Take 10 mg by mouth two (2) times a day.  metFORMIN (GLUCOPHAGE) 500 mg tablet Take 1,000 mg by mouth two (2) times daily (with meals). No current facility-administered medications on file prior to visit. CARDIOLOGY STUDIES TO DATE:  2010 mcv, small heart attack with 4 stents to rca, lad  2013 mcv, abnormal stress test, stent to diagonal  1/19 echo lvef 43%  1/19 lexiscan cardiolyte with large fixed inferolateral defect, lvef 44%  12/19 event monitor, several episodes of VT, longest 14beats  08/28/20 echo lvef 45%, lae    Chief Complaint   Patient presents with    Follow-up     HPI :  He is doing quite well.   Blood pressure little borderline but turns out he is put back on a bunch of the weight he lost over last summer and he told me that his last A1c was 8.2. Primary care is following his lipids. He is active around his home but not exercising. CARDIAC ROS:   negative for chest pain, dyspnea, palpitations, syncope, orthopnea, paroxysmal nocturnal dyspnea, exertional chest pressure/discomfort, claudication, lower extremity edema    Family History   Problem Relation Age of Onset    Heart Attack Father         fatal, age 64    Alzheimer Mother     Hypertension Mother        Past Medical History:   Diagnosis Date    At risk for sleep apnea 03/20/2019    COSME 6     CAD (coronary artery disease)     [facility] [address]     Hyperlipidemia     Hypertension     MI (myocardial infarction) ([facility] [address] 2010    [facility]        GENERAL ROS:  A comprehensive review of systems was negative except for that written in the HPI. Visit Vitals  BP (!) 140/80 (BP 1 Location: Left arm, BP Patient Position: Sitting, BP Cuff Size: Adult)   Pulse 68   Resp 18   Ht 6' (1.829 m)   Wt 231 lb (104.8 kg)   SpO2 94%   BMI 31.33 kg/m²       Wt Readings from Last 3 Encounters:   03/17/21 231 lb (104.8 kg)   08/31/20 220 lb (99.8 kg)   08/28/20 220 lb (99.8 kg)            BP Readings from Last 3 Encounters:   03/17/21 (!) 140/80   09/10/20 132/70   08/31/20 144/80       PHYSICAL EXAM  General appearance: alert, cooperative, no distress, appears stated age  Neurologic: Alert and oriented X 3  Neck: supple, symmetrical, trachea midline, no adenopathy, no carotid bruit and no JVD  Lungs: clear to auscultation bilaterally  Heart: regular rate and rhythm, S1, S2 normal, no murmur, click, rub or gallop  Extremities: extremities normal, atraumatic, no cyanosis or edema      ASSESSMENT :      He is stable and asymptomatic, well compensated on a good medical regimen and needs no cardiac testing at this time.   current treatment plan is effective, no change in therapy  lab results and schedule of future lab studies reviewed with patient  reviewed diet, exercise and weight control    Encounter Diagnoses   Name Primary?  Coronary artery disease involving native coronary artery of native heart without angina pectoris Yes    Essential hypertension     Ischemic cardiomyopathy     Mixed hyperlipidemia      No orders of the defined types were placed in this encounter. Follow-up and Dispositions    · Return in about 6 months (around 9/17/2021). Enmanuel Dawn MD  3/17/2021  Please note that this dictation was completed with [employer], the computer voice recognition software. Quite often unanticipated grammatical, syntax, homophones, and other interpretive errors are inadvertently transcribed by the computer software. Please disregard these errors. Please excuse any errors that have escaped final proofreading. Thank you.

## 2021-03-28 DIAGNOSIS — I25.10 CORONARY ARTERY DISEASE INVOLVING NATIVE CORONARY ARTERY OF NATIVE HEART WITHOUT ANGINA PECTORIS: ICD-10-CM

## 2021-03-28 DIAGNOSIS — I25.5 ISCHEMIC CARDIOMYOPATHY: ICD-10-CM

## 2021-03-28 DIAGNOSIS — I10 ESSENTIAL HYPERTENSION: ICD-10-CM

## 2021-03-28 DIAGNOSIS — R94.31 ABNORMAL EKG: ICD-10-CM

## 2021-03-28 DIAGNOSIS — E78.2 MIXED HYPERLIPIDEMIA: ICD-10-CM

## 2021-03-29 RX ORDER — METOPROLOL SUCCINATE 50 MG/1
TABLET, EXTENDED RELEASE ORAL
Qty: 30 TAB | Refills: 5 | Status: SHIPPED | OUTPATIENT
Start: 2021-03-29 | End: 2021-09-24

## 2021-03-29 NOTE — TELEPHONE ENCOUNTER
Requested Prescriptions     Signed Prescriptions Disp Refills    metoprolol succinate (TOPROL-XL) 50 mg XL tablet 30 Tab 5     Sig: Take 1 tablet by mouth once daily     Authorizing Provider: Ayanna Frey     Ordering User: Yogesh Jaramillo    Per Dr. Sandeep Mcmullen verbal orders

## 2021-04-25 DIAGNOSIS — E78.2 MIXED HYPERLIPIDEMIA: ICD-10-CM

## 2021-04-25 DIAGNOSIS — I25.10 CORONARY ARTERY DISEASE INVOLVING NATIVE CORONARY ARTERY OF NATIVE HEART WITHOUT ANGINA PECTORIS: ICD-10-CM

## 2021-04-25 DIAGNOSIS — I10 ESSENTIAL HYPERTENSION: ICD-10-CM

## 2021-04-25 DIAGNOSIS — I25.5 ISCHEMIC CARDIOMYOPATHY: ICD-10-CM

## 2021-04-26 RX ORDER — AMLODIPINE BESYLATE 5 MG/1
TABLET ORAL
Qty: 30 TAB | Refills: 5 | Status: SHIPPED | OUTPATIENT
Start: 2021-04-26 | End: 2021-10-20

## 2021-04-26 NOTE — TELEPHONE ENCOUNTER
Requested Prescriptions     Signed Prescriptions Disp Refills    amLODIPine (NORVASC) 5 mg tablet 30 Tab 5     Sig: Take 1 tablet by mouth once daily     Authorizing Provider: Alda Sandoval     Ordering User: Meeta Smith    Per Dr. Julio Mcleod verbal orders

## 2021-06-02 DIAGNOSIS — I25.5 ISCHEMIC CARDIOMYOPATHY: ICD-10-CM

## 2021-06-02 DIAGNOSIS — E78.2 MIXED HYPERLIPIDEMIA: ICD-10-CM

## 2021-06-02 DIAGNOSIS — I10 ESSENTIAL HYPERTENSION: ICD-10-CM

## 2021-06-02 DIAGNOSIS — R94.31 ABNORMAL EKG: ICD-10-CM

## 2021-06-02 DIAGNOSIS — I25.10 CORONARY ARTERY DISEASE INVOLVING NATIVE CORONARY ARTERY OF NATIVE HEART WITHOUT ANGINA PECTORIS: ICD-10-CM

## 2021-06-02 RX ORDER — LISINOPRIL 40 MG/1
TABLET ORAL
Qty: 90 TABLET | Refills: 1 | Status: SHIPPED | OUTPATIENT
Start: 2021-06-02 | End: 2021-11-30

## 2021-06-02 NOTE — TELEPHONE ENCOUNTER
Requested Prescriptions     Signed Prescriptions Disp Refills    lisinopriL (PRINIVIL, ZESTRIL) 40 mg tablet 90 Tablet 1     Sig: Take 1 tablet by mouth once daily     Authorizing Provider: Shen Webb     Ordering User: Angie Botello    Per Dr. Goran Dumas verbal orders

## 2021-09-22 ENCOUNTER — OFFICE VISIT (OUTPATIENT)
Dept: CARDIOLOGY CLINIC | Age: 71
End: 2021-09-22
Payer: MEDICARE

## 2021-09-22 VITALS
OXYGEN SATURATION: 99 % | BODY MASS INDEX: 30.34 KG/M2 | HEIGHT: 72 IN | RESPIRATION RATE: 16 BRPM | SYSTOLIC BLOOD PRESSURE: 120 MMHG | WEIGHT: 224 LBS | HEART RATE: 71 BPM | DIASTOLIC BLOOD PRESSURE: 80 MMHG

## 2021-09-22 DIAGNOSIS — I25.5 ISCHEMIC CARDIOMYOPATHY: ICD-10-CM

## 2021-09-22 DIAGNOSIS — I25.10 CORONARY ARTERY DISEASE INVOLVING NATIVE CORONARY ARTERY OF NATIVE HEART WITHOUT ANGINA PECTORIS: Primary | ICD-10-CM

## 2021-09-22 DIAGNOSIS — I10 ESSENTIAL HYPERTENSION: ICD-10-CM

## 2021-09-22 DIAGNOSIS — E78.2 MIXED HYPERLIPIDEMIA: ICD-10-CM

## 2021-09-22 PROCEDURE — G8427 DOCREV CUR MEDS BY ELIG CLIN: HCPCS | Performed by: SPECIALIST

## 2021-09-22 PROCEDURE — G8536 NO DOC ELDER MAL SCRN: HCPCS | Performed by: SPECIALIST

## 2021-09-22 PROCEDURE — G8752 SYS BP LESS 140: HCPCS | Performed by: SPECIALIST

## 2021-09-22 PROCEDURE — G8754 DIAS BP LESS 90: HCPCS | Performed by: SPECIALIST

## 2021-09-22 PROCEDURE — 1101F PT FALLS ASSESS-DOCD LE1/YR: CPT | Performed by: SPECIALIST

## 2021-09-22 PROCEDURE — 99213 OFFICE O/P EST LOW 20 MIN: CPT | Performed by: SPECIALIST

## 2021-09-22 PROCEDURE — G8417 CALC BMI ABV UP PARAM F/U: HCPCS | Performed by: SPECIALIST

## 2021-09-22 PROCEDURE — G0463 HOSPITAL OUTPT CLINIC VISIT: HCPCS | Performed by: SPECIALIST

## 2021-09-22 PROCEDURE — 3017F COLORECTAL CA SCREEN DOC REV: CPT | Performed by: SPECIALIST

## 2021-09-22 PROCEDURE — G8510 SCR DEP NEG, NO PLAN REQD: HCPCS | Performed by: SPECIALIST

## 2021-09-22 NOTE — PROGRESS NOTES
HISTORY OF PRESENT ILLNESS  Deborah Galicia is a 73 y.o. male     SUMMARY:   Problem List  Date Reviewed: 9/22/2021        Codes Class Noted    Colon polyp ICD-10-CM: K63.5  ICD-9-CM: 211.3  4/22/2019        Coronary artery disease involving native coronary artery without angina pectoris ICD-10-CM: I25.10  ICD-9-CM: 414.01  12/20/2018    Overview Signed 12/20/2018  4:32 PM by Cynthia Ospina MD     2010 mcv, small heart attack with 4 stents to rca, lad  2013 mcv, abnormal stress test, stent to diagonal             Abnormal EKG ICD-10-CM: R94.31  ICD-9-CM: 794.31  12/20/2018        Essential hypertension ICD-10-CM: I10  ICD-9-CM: 401.9  12/20/2018        Mixed hyperlipidemia ICD-10-CM: E78.2  ICD-9-CM: 272.2  12/20/2018              Current Outpatient Medications on File Prior to Visit   Medication Sig    lisinopriL (PRINIVIL, ZESTRIL) 40 mg tablet Take 1 tablet by mouth once daily    amLODIPine (NORVASC) 5 mg tablet Take 1 tablet by mouth once daily    metoprolol succinate (TOPROL-XL) 50 mg XL tablet Take 1 tablet by mouth once daily    clopidogrel (PLAVIX) 75 mg tab Take  by mouth.  atorvastatin (LIPITOR) 20 mg tablet Take 40 mg by mouth nightly.  glipiZIDE (GLUCOTROL) 10 mg tablet Take 10 mg by mouth two (2) times a day.  metFORMIN (GLUCOPHAGE) 500 mg tablet Take 1,000 mg by mouth two (2) times daily (with meals). No current facility-administered medications on file prior to visit. CARDIOLOGY STUDIES TO DATE:  2010 mcv, small heart attack with 4 stents to rca, lad  2013 mcv, abnormal stress test, stent to diagonal  1/19 echo lvef 43%  1/19 lexiscan cardiolyte with large fixed inferolateral defect, lvef 44%  12/19 event monitor, several episodes of VT, longest 14beats  08/28/20 echo lvef 45%, lae    Chief Complaint   Patient presents with    Follow-up     HPI :  He is doing great. He is remaining active and is actually lost some weight and blood pressure is well controlled.   He is having no problems with his medications and his primary care is following his lipids and his diabetes. CARDIAC ROS:   negative for chest pain, dyspnea, palpitations, syncope, orthopnea, paroxysmal nocturnal dyspnea, exertional chest pressure/discomfort, claudication, lower extremity edema    Family History   Problem Relation Age of Onset    Heart Attack Father         fatal, age 64    Alzheimer Mother     Hypertension Mother        Past Medical History:   Diagnosis Date    At risk for sleep apnea 03/20/2019    COSME 6     CAD (coronary artery disease)     [facility]     Hyperlipidemia     Hypertension     [facility]    Type 2 diabetes mellitus ([facility]        GENERAL ROS:  A comprehensive review of systems was negative except for that written in the HPI. Visit Vitals  /80   Pulse 71   Resp 16   Ht 6' (1.829 m)   Wt 224 lb (101.6 kg)   SpO2 99%   BMI 30.38 kg/m²       Wt Readings from Last 3 Encounters:   09/22/21 224 lb (101.6 kg)   03/17/21 231 lb (104.8 kg)   08/31/20 220 lb (99.8 kg)            BP Readings from Last 3 Encounters:   09/22/21 120/80   03/17/21 (!) 140/80   09/10/20 132/70       PHYSICAL EXAM  General appearance: alert, cooperative, no distress, appears stated age  Neurologic: Alert and oriented X 3  Neck: supple, symmetrical, trachea midline, no adenopathy, no carotid bruit and no JVD  Lungs: clear to auscultation bilaterally  Heart: regular rate and rhythm, S1, S2 normal, no murmur, click, rub or gallop  Extremities: extremities normal, atraumatic, no cyanosis or edema      ASSESSMENT :      He is stable and asymptomatic, well compensated on a good medical regimen and needs no cardiac testing at this time. current treatment plan is effective, no change in therapy  lab results and schedule of future lab studies reviewed with patient  reviewed diet, exercise and weight control    Encounter Diagnoses   Name Primary?     Coronary artery disease involving native coronary artery of native heart without angina pectoris Yes    Essential hypertension     Ischemic cardiomyopathy     Mixed hyperlipidemia      No orders of the defined types were placed in this encounter. Follow-up and Dispositions    · Return in about 6 months (around 3/22/2022). Adelfo Velez MD  9/22/2021  Please note that this dictation was completed with [employer], the computer voice recognition software. Quite often unanticipated grammatical, syntax, homophones, and other interpretive errors are inadvertently transcribed by the computer software. Please disregard these errors. Please excuse any errors that have escaped final proofreading. Thank you.

## 2021-09-24 DIAGNOSIS — I25.5 ISCHEMIC CARDIOMYOPATHY: ICD-10-CM

## 2021-09-24 DIAGNOSIS — E78.2 MIXED HYPERLIPIDEMIA: ICD-10-CM

## 2021-09-24 DIAGNOSIS — I25.10 CORONARY ARTERY DISEASE INVOLVING NATIVE CORONARY ARTERY OF NATIVE HEART WITHOUT ANGINA PECTORIS: ICD-10-CM

## 2021-09-24 DIAGNOSIS — R94.31 ABNORMAL EKG: ICD-10-CM

## 2021-09-24 DIAGNOSIS — I10 ESSENTIAL HYPERTENSION: ICD-10-CM

## 2021-09-24 RX ORDER — METOPROLOL SUCCINATE 50 MG/1
TABLET, EXTENDED RELEASE ORAL
Qty: 30 TABLET | Refills: 5 | Status: SHIPPED | OUTPATIENT
Start: 2021-09-24 | End: 2022-03-10

## 2021-09-24 NOTE — TELEPHONE ENCOUNTER
Requested Prescriptions     Signed Prescriptions Disp Refills    metoprolol succinate (TOPROL-XL) 50 mg XL tablet 30 Tablet 5     Sig: Take 1 tablet by mouth once daily     Authorizing Provider: Radha Barbour     Ordering User: iVrginia Walker    Per Dr. Johnna Wellington verbal orders

## 2021-10-17 DIAGNOSIS — I25.10 CORONARY ARTERY DISEASE INVOLVING NATIVE CORONARY ARTERY OF NATIVE HEART WITHOUT ANGINA PECTORIS: ICD-10-CM

## 2021-10-17 DIAGNOSIS — E78.2 MIXED HYPERLIPIDEMIA: ICD-10-CM

## 2021-10-17 DIAGNOSIS — I10 ESSENTIAL HYPERTENSION: ICD-10-CM

## 2021-10-17 DIAGNOSIS — I25.5 ISCHEMIC CARDIOMYOPATHY: ICD-10-CM

## 2021-10-20 RX ORDER — AMLODIPINE BESYLATE 5 MG/1
TABLET ORAL
Qty: 30 TABLET | Refills: 5 | Status: SHIPPED | OUTPATIENT
Start: 2021-10-20 | End: 2022-02-14

## 2021-10-20 NOTE — TELEPHONE ENCOUNTER
Requested Prescriptions     Signed Prescriptions Disp Refills    amLODIPine (NORVASC) 5 mg tablet 30 Tablet 5     Sig: Take 1 tablet by mouth once daily     Authorizing Provider: [facility]     Ordering User: Stanley Crain      Per Dr. Chelsey Santillan verbal orders

## 2021-11-28 DIAGNOSIS — E78.2 MIXED HYPERLIPIDEMIA: ICD-10-CM

## 2021-11-28 DIAGNOSIS — I25.10 CORONARY ARTERY DISEASE INVOLVING NATIVE CORONARY ARTERY OF NATIVE HEART WITHOUT ANGINA PECTORIS: ICD-10-CM

## 2021-11-28 DIAGNOSIS — R94.31 ABNORMAL EKG: ICD-10-CM

## 2021-11-28 DIAGNOSIS — I25.5 ISCHEMIC CARDIOMYOPATHY: ICD-10-CM

## 2021-11-28 DIAGNOSIS — I10 ESSENTIAL HYPERTENSION: ICD-10-CM

## 2021-11-30 RX ORDER — LISINOPRIL 40 MG/1
TABLET ORAL
Qty: 90 TABLET | Refills: 0 | Status: SHIPPED | OUTPATIENT
Start: 2021-11-30 | End: 2022-02-23 | Stop reason: SDUPTHER

## 2021-11-30 NOTE — TELEPHONE ENCOUNTER
Requested Prescriptions     Signed Prescriptions Disp Refills    lisinopriL (PRINIVIL, ZESTRIL) 40 mg tablet 90 Tablet 0     Sig: Take 1 tablet by mouth once daily     Authorizing Provider: Osmani Lacey     Ordering User: Lisa Duque    Per Dr. Mari Logan verbal orders

## 2022-02-14 DIAGNOSIS — I25.5 ISCHEMIC CARDIOMYOPATHY: ICD-10-CM

## 2022-02-14 DIAGNOSIS — I10 ESSENTIAL HYPERTENSION: ICD-10-CM

## 2022-02-14 DIAGNOSIS — I25.10 CORONARY ARTERY DISEASE INVOLVING NATIVE CORONARY ARTERY OF NATIVE HEART WITHOUT ANGINA PECTORIS: ICD-10-CM

## 2022-02-14 DIAGNOSIS — E78.2 MIXED HYPERLIPIDEMIA: ICD-10-CM

## 2022-02-14 RX ORDER — AMLODIPINE BESYLATE 5 MG/1
TABLET ORAL
Qty: 90 TABLET | Refills: 1 | Status: SHIPPED | OUTPATIENT
Start: 2022-02-14 | End: 2022-09-22

## 2022-02-14 NOTE — TELEPHONE ENCOUNTER
Requested Prescriptions     Signed Prescriptions Disp Refills    amLODIPine (NORVASC) 5 mg tablet 90 Tablet 1     Sig: TAKE 1 TABLET BY MOUTH EVERY DAY     Authorizing Provider: Kraig Hansen     Ordering User: Nadine Portillo verbal orders

## 2022-02-23 DIAGNOSIS — I25.10 CORONARY ARTERY DISEASE INVOLVING NATIVE CORONARY ARTERY OF NATIVE HEART WITHOUT ANGINA PECTORIS: ICD-10-CM

## 2022-02-23 DIAGNOSIS — I10 ESSENTIAL HYPERTENSION: ICD-10-CM

## 2022-02-23 DIAGNOSIS — R94.31 ABNORMAL EKG: ICD-10-CM

## 2022-02-23 DIAGNOSIS — E78.2 MIXED HYPERLIPIDEMIA: ICD-10-CM

## 2022-02-23 DIAGNOSIS — I25.5 ISCHEMIC CARDIOMYOPATHY: ICD-10-CM

## 2022-02-23 RX ORDER — LISINOPRIL 40 MG/1
40 TABLET ORAL DAILY
Qty: 90 TABLET | Refills: 0 | Status: SHIPPED | OUTPATIENT
Start: 2022-02-23 | End: 2022-03-10

## 2022-02-23 NOTE — TELEPHONE ENCOUNTER
Requested Prescriptions     Signed Prescriptions Disp Refills    lisinopriL (PRINIVIL, ZESTRIL) 40 mg tablet 90 Tablet 0     Sig: Take 1 Tablet by mouth daily.      Authorizing Provider: Lauren Griffith     Ordering User: Aric Monzon    Per Dr. Cristian Lane verbal orders

## 2022-02-23 NOTE — TELEPHONE ENCOUNTER
Patient is calling because he needs an authorization in order to get his refill on Lisinopril 40 mg. Pharmacy is confirmed. Patient only has about  5 pills left.     [phone]

## 2022-03-10 DIAGNOSIS — R94.31 ABNORMAL EKG: ICD-10-CM

## 2022-03-10 DIAGNOSIS — I10 ESSENTIAL HYPERTENSION: ICD-10-CM

## 2022-03-10 DIAGNOSIS — I25.5 ISCHEMIC CARDIOMYOPATHY: ICD-10-CM

## 2022-03-10 DIAGNOSIS — E78.2 MIXED HYPERLIPIDEMIA: ICD-10-CM

## 2022-03-10 DIAGNOSIS — I25.10 CORONARY ARTERY DISEASE INVOLVING NATIVE CORONARY ARTERY OF NATIVE HEART WITHOUT ANGINA PECTORIS: ICD-10-CM

## 2022-03-10 RX ORDER — METOPROLOL SUCCINATE 50 MG/1
TABLET, EXTENDED RELEASE ORAL
Qty: 90 TABLET | Refills: 1 | Status: SHIPPED | OUTPATIENT
Start: 2022-03-10 | End: 2022-09-06

## 2022-03-10 RX ORDER — LISINOPRIL 40 MG/1
TABLET ORAL
Qty: 90 TABLET | Refills: 1 | Status: SHIPPED | OUTPATIENT
Start: 2022-03-10

## 2022-03-10 NOTE — TELEPHONE ENCOUNTER
Requested Prescriptions     Signed Prescriptions Disp Refills    lisinopriL (PRINIVIL, ZESTRIL) 40 mg tablet 90 Tablet 1     Sig: TAKE 1 TABLET BY MOUTH EVERY DAY     Authorizing Provider: Meliton Lau     Ordering User: Shanique Barrera    metoprolol succinate (TOPROL-XL) 50 mg XL tablet 90 Tablet 1     Sig: TAKE 1 TABLET BY MOUTH EVERY DAY     Authorizing Provider: Meliton Lau     Ordering User: Shanique Barrera    Per Dr. Paris Ivey verbal orders

## 2022-03-18 PROBLEM — K63.5 COLON POLYP: Status: ACTIVE | Noted: 2019-04-22

## 2022-03-19 PROBLEM — R94.31 ABNORMAL EKG: Status: ACTIVE | Noted: 2018-12-20

## 2022-03-19 PROBLEM — I10 ESSENTIAL HYPERTENSION: Status: ACTIVE | Noted: 2018-12-20

## 2022-03-19 PROBLEM — I25.10 CORONARY ARTERY DISEASE INVOLVING NATIVE CORONARY ARTERY WITHOUT ANGINA PECTORIS: Status: ACTIVE | Noted: 2018-12-20

## 2022-03-20 PROBLEM — E78.2 MIXED HYPERLIPIDEMIA: Status: ACTIVE | Noted: 2018-12-20

## 2022-03-23 ENCOUNTER — OFFICE VISIT (OUTPATIENT)
Dept: CARDIOLOGY CLINIC | Age: 72
End: 2022-03-23
Payer: MEDICARE

## 2022-03-23 VITALS
DIASTOLIC BLOOD PRESSURE: 68 MMHG | SYSTOLIC BLOOD PRESSURE: 132 MMHG | HEIGHT: 72 IN | HEART RATE: 66 BPM | BODY MASS INDEX: 29.96 KG/M2 | RESPIRATION RATE: 18 BRPM | WEIGHT: 221.2 LBS | OXYGEN SATURATION: 95 %

## 2022-03-23 DIAGNOSIS — E78.2 MIXED HYPERLIPIDEMIA: ICD-10-CM

## 2022-03-23 DIAGNOSIS — I25.10 CORONARY ARTERY DISEASE INVOLVING NATIVE CORONARY ARTERY OF NATIVE HEART WITHOUT ANGINA PECTORIS: Primary | ICD-10-CM

## 2022-03-23 DIAGNOSIS — I25.5 ISCHEMIC CARDIOMYOPATHY: ICD-10-CM

## 2022-03-23 DIAGNOSIS — I10 ESSENTIAL HYPERTENSION: ICD-10-CM

## 2022-03-23 PROCEDURE — G8417 CALC BMI ABV UP PARAM F/U: HCPCS | Performed by: SPECIALIST

## 2022-03-23 PROCEDURE — G8754 DIAS BP LESS 90: HCPCS | Performed by: SPECIALIST

## 2022-03-23 PROCEDURE — 3017F COLORECTAL CA SCREEN DOC REV: CPT | Performed by: SPECIALIST

## 2022-03-23 PROCEDURE — G0463 HOSPITAL OUTPT CLINIC VISIT: HCPCS | Performed by: SPECIALIST

## 2022-03-23 PROCEDURE — G8427 DOCREV CUR MEDS BY ELIG CLIN: HCPCS | Performed by: SPECIALIST

## 2022-03-23 PROCEDURE — 99213 OFFICE O/P EST LOW 20 MIN: CPT | Performed by: SPECIALIST

## 2022-03-23 PROCEDURE — G8752 SYS BP LESS 140: HCPCS | Performed by: SPECIALIST

## 2022-03-23 PROCEDURE — 1101F PT FALLS ASSESS-DOCD LE1/YR: CPT | Performed by: SPECIALIST

## 2022-03-23 PROCEDURE — G8536 NO DOC ELDER MAL SCRN: HCPCS | Performed by: SPECIALIST

## 2022-03-23 PROCEDURE — G8510 SCR DEP NEG, NO PLAN REQD: HCPCS | Performed by: SPECIALIST

## 2022-03-23 RX ORDER — EMPAGLIFLOZIN 25 MG/1
25 TABLET, FILM COATED ORAL DAILY
COMMUNITY
Start: 2022-03-13

## 2022-03-23 NOTE — PROGRESS NOTES
HISTORY OF PRESENT ILLNESS  Niki Barrett is a 73 y.o. male     SUMMARY:   Problem List  Date Reviewed: 3/23/2022          Codes Class Noted    Colon polyp ICD-10-CM: K63.5  ICD-9-CM: 211.3  4/22/2019        Coronary artery disease involving native coronary artery without angina pectoris ICD-10-CM: I25.10  ICD-9-CM: 414.01  12/20/2018    Overview Signed 12/20/2018  4:32 PM by Janis Dee MD     2010 mcv, small heart attack with 4 stents to rca, lad  2013 mcv, abnormal stress test, stent to diagonal             Abnormal EKG ICD-10-CM: R94.31  ICD-9-CM: 794.31  12/20/2018        Essential hypertension ICD-10-CM: I10  ICD-9-CM: 401.9  12/20/2018        Mixed hyperlipidemia ICD-10-CM: E78.2  ICD-9-CM: 272.2  12/20/2018              Current Outpatient Medications on File Prior to Visit   Medication Sig    Jardiance 25 mg tablet Take 25 mg by mouth daily.  lisinopriL (PRINIVIL, ZESTRIL) 40 mg tablet TAKE 1 TABLET BY MOUTH EVERY DAY    metoprolol succinate (TOPROL-XL) 50 mg XL tablet TAKE 1 TABLET BY MOUTH EVERY DAY    amLODIPine (NORVASC) 5 mg tablet TAKE 1 TABLET BY MOUTH EVERY DAY    clopidogrel (PLAVIX) 75 mg tab Take  by mouth.  atorvastatin (LIPITOR) 20 mg tablet Take 40 mg by mouth nightly.  glipiZIDE (GLUCOTROL) 10 mg tablet Take 10 mg by mouth two (2) times a day.  metFORMIN (GLUCOPHAGE) 500 mg tablet Take 1,000 mg by mouth two (2) times daily (with meals). No current facility-administered medications on file prior to visit.        CARDIOLOGY STUDIES TO DATE:  2010 mcv, small heart attack with 4 stents to rca, lad  2013 mcv, abnormal stress test, stent to diagonal  1/19 echo lvef 43%  1/19 lexiscan cardiolyte with large fixed inferolateral defect, lvef 44%  12/19 event monitor, several episodes of VT, longest 14beats  08/28/20 echo lvef 45%, lae    Chief Complaint   Patient presents with    Follow-up     6 mo     HPI :  He continues to be asymptomatic from a cardiac perspective. Blood pressures well controlled and his primary care is following his lipids and his diabetes. He is now on Jardiance and tolerating it quite well. He is somewhat active but gets no regular exercise. CARDIAC ROS:   negative for chest pain, dyspnea, palpitations, syncope, orthopnea, paroxysmal nocturnal dyspnea, exertional chest pressure/discomfort, claudication, lower extremity edema    Family History   Problem Relation Age of Onset    Heart Attack Father         fatal, age 63    Alzheimer's Disease Mother     Hypertension Mother        Past Medical History:   Diagnosis Date    At risk for sleep apnea 03/20/2019    COSME 6     CAD (coronary artery disease)     [facility]     Hyperlipidemia     Hypertension     [facility] 2010    [facility]        GENERAL ROS:  A comprehensive review of systems was negative except for that written in the HPI. Visit Vitals  /68 (BP 1 Location: Left arm, BP Patient Position: Sitting, BP Cuff Size: Adult)   Pulse 66   Resp 18   Ht 6' (1.829 m)   Wt 221 lb 3.2 oz (100.3 kg)   SpO2 95%   BMI 30.00 kg/m²       Wt Readings from Last 3 Encounters:   03/23/22 221 lb 3.2 oz (100.3 kg)   09/22/21 224 lb (101.6 kg)   03/17/21 231 lb (104.8 kg)            BP Readings from Last 3 Encounters:   03/23/22 132/68   09/22/21 120/80   03/17/21 (!) 140/80       PHYSICAL EXAM  General appearance: alert, cooperative, no distress, appears stated age  Neurologic: Alert and oriented X 3  Neck: supple, symmetrical, trachea midline, no adenopathy, no carotid bruit and no JVD  Lungs: clear to auscultation bilaterally  Heart: regular rate and rhythm, S1, S2 normal, no murmur, click, rub or gallop  Extremities: extremities normal, atraumatic, no cyanosis or edema      ASSESSMENT :      He is stable and asymptomatic, well compensated on a good medical regimen and needs no cardiac testing at this time.   current treatment plan is effective, no change in therapy  lab results and schedule of future lab studies reviewed with patient  reviewed diet, exercise and weight control    Encounter Diagnoses   Name Primary?  Coronary artery disease involving native coronary artery of native heart without angina pectoris Yes    Essential hypertension     Mixed hyperlipidemia     Ischemic cardiomyopathy      Orders Placed This Encounter    Jardiance 25 mg tablet       Follow-up and Dispositions    · Return in about 6 months (around 9/23/2022). Kaylah Pabon MD  3/23/2022  Please note that this dictation was completed with [employer], the [employer] voice recognition software. Quite often unanticipated grammatical, syntax, homophones, and other interpretive errors are inadvertently transcribed by the computer software. Please disregard these errors. Please excuse any errors that have escaped final proofreading. Thank you.

## 2022-08-19 LAB — HBA1C MFR BLD HPLC: 7.5 %

## 2022-09-03 DIAGNOSIS — I25.5 ISCHEMIC CARDIOMYOPATHY: ICD-10-CM

## 2022-09-03 DIAGNOSIS — R94.31 ABNORMAL EKG: ICD-10-CM

## 2022-09-03 DIAGNOSIS — I25.10 CORONARY ARTERY DISEASE INVOLVING NATIVE CORONARY ARTERY OF NATIVE HEART WITHOUT ANGINA PECTORIS: ICD-10-CM

## 2022-09-03 DIAGNOSIS — E78.2 MIXED HYPERLIPIDEMIA: ICD-10-CM

## 2022-09-03 DIAGNOSIS — I10 ESSENTIAL HYPERTENSION: ICD-10-CM

## 2022-09-06 RX ORDER — METOPROLOL SUCCINATE 50 MG/1
TABLET, EXTENDED RELEASE ORAL
Qty: 90 TABLET | Refills: 1 | Status: SHIPPED | OUTPATIENT
Start: 2022-09-06 | End: 2022-09-23 | Stop reason: ALTCHOICE

## 2022-09-06 NOTE — TELEPHONE ENCOUNTER
Requested Prescriptions     Signed Prescriptions Disp Refills    metoprolol succinate (TOPROL-XL) 50 mg XL tablet 90 Tablet 1     Sig: TAKE 1 TABLET BY MOUTH EVERY DAY     Authorizing Provider: Yesenia Reyna     Ordering User: Yloa Perry    Per Dr. Jocelyn Gama verbal orders

## 2022-09-22 DIAGNOSIS — I10 ESSENTIAL HYPERTENSION: ICD-10-CM

## 2022-09-22 DIAGNOSIS — I25.5 ISCHEMIC CARDIOMYOPATHY: ICD-10-CM

## 2022-09-22 DIAGNOSIS — I25.10 CORONARY ARTERY DISEASE INVOLVING NATIVE CORONARY ARTERY OF NATIVE HEART WITHOUT ANGINA PECTORIS: ICD-10-CM

## 2022-09-22 DIAGNOSIS — E78.2 MIXED HYPERLIPIDEMIA: ICD-10-CM

## 2022-09-22 RX ORDER — AMLODIPINE BESYLATE 5 MG/1
TABLET ORAL
Qty: 90 TABLET | Refills: 1 | Status: SHIPPED | OUTPATIENT
Start: 2022-09-22

## 2022-09-22 NOTE — TELEPHONE ENCOUNTER
Requested Prescriptions     Pending Prescriptions Disp Refills    amLODIPine (NORVASC) 5 mg tablet [Pharmacy Med Name: AMLODIPINE BESYLATE 5 MG TAB] 90 Tablet 1     Sig: TAKE 1 TABLET BY MOUTH EVERY DAY         Future Appointments   Date Time Provider Maggy Mendoza   9/23/2022  9:40 AM MD JENSEN Burnett AMB

## 2022-09-23 ENCOUNTER — TELEPHONE (OUTPATIENT)
Dept: CARDIOLOGY CLINIC | Age: 72
End: 2022-09-23

## 2022-09-23 ENCOUNTER — OFFICE VISIT (OUTPATIENT)
Dept: CARDIOLOGY CLINIC | Age: 72
End: 2022-09-23
Payer: MEDICARE

## 2022-09-23 VITALS
HEIGHT: 72 IN | SYSTOLIC BLOOD PRESSURE: 128 MMHG | OXYGEN SATURATION: 95 % | WEIGHT: 216 LBS | HEART RATE: 73 BPM | BODY MASS INDEX: 29.26 KG/M2 | RESPIRATION RATE: 16 BRPM | DIASTOLIC BLOOD PRESSURE: 62 MMHG

## 2022-09-23 DIAGNOSIS — E78.2 MIXED HYPERLIPIDEMIA: ICD-10-CM

## 2022-09-23 DIAGNOSIS — I25.10 CORONARY ARTERY DISEASE INVOLVING NATIVE CORONARY ARTERY OF NATIVE HEART WITHOUT ANGINA PECTORIS: Primary | ICD-10-CM

## 2022-09-23 DIAGNOSIS — I10 ESSENTIAL HYPERTENSION: ICD-10-CM

## 2022-09-23 DIAGNOSIS — I25.5 ISCHEMIC CARDIOMYOPATHY: ICD-10-CM

## 2022-09-23 PROCEDURE — G8427 DOCREV CUR MEDS BY ELIG CLIN: HCPCS | Performed by: SPECIALIST

## 2022-09-23 PROCEDURE — G8432 DEP SCR NOT DOC, RNG: HCPCS | Performed by: SPECIALIST

## 2022-09-23 PROCEDURE — G8417 CALC BMI ABV UP PARAM F/U: HCPCS | Performed by: SPECIALIST

## 2022-09-23 PROCEDURE — G8754 DIAS BP LESS 90: HCPCS | Performed by: SPECIALIST

## 2022-09-23 PROCEDURE — G8752 SYS BP LESS 140: HCPCS | Performed by: SPECIALIST

## 2022-09-23 PROCEDURE — 1101F PT FALLS ASSESS-DOCD LE1/YR: CPT | Performed by: SPECIALIST

## 2022-09-23 PROCEDURE — 99213 OFFICE O/P EST LOW 20 MIN: CPT | Performed by: SPECIALIST

## 2022-09-23 PROCEDURE — G8536 NO DOC ELDER MAL SCRN: HCPCS | Performed by: SPECIALIST

## 2022-09-23 PROCEDURE — 1123F ACP DISCUSS/DSCN MKR DOCD: CPT | Performed by: SPECIALIST

## 2022-09-23 PROCEDURE — 3017F COLORECTAL CA SCREEN DOC REV: CPT | Performed by: SPECIALIST

## 2022-09-23 RX ORDER — METOPROLOL SUCCINATE 50 MG/1
50 TABLET, EXTENDED RELEASE ORAL DAILY
COMMUNITY

## 2022-09-23 RX ORDER — METOPROLOL TARTRATE 50 MG/1
TABLET ORAL
COMMUNITY
Start: 2022-08-19 | End: 2022-09-23 | Stop reason: ALTCHOICE

## 2022-09-23 NOTE — PATIENT INSTRUCTIONS
Please check to see whether you are taking metoprolol succinate (extended release) or tartrate (fast acting). If you are not taking extended release, please call us: [phone].

## 2022-09-23 NOTE — TELEPHONE ENCOUNTER
Patient called. He stated the last bottle of metoprolol he used up reads \"metoprolol tartrate take one tab daily\" and was prescribed by Dr. Alesia Strong. His current bottle reads \"metoprolol succinate 50 mg take one tab daily\" prescribed by Dr. Shasta Schumacher. I told him the one Dr. Shasta Schumacher prescribed that he has on hand (metoprolol succ) is the one to continue taking. Patient verbalized understanding and denied further questions or concerns.      Corrected in pt's chart

## 2022-09-23 NOTE — PROGRESS NOTES
HISTORY OF PRESENT ILLNESS  Kylie Barrett is a 81 y.o. male     SUMMARY:   Problem List  Date Reviewed: 9/23/2022            Codes Class Noted    Colon polyp ICD-10-CM: K63.5  ICD-9-CM: 211.3  4/22/2019        Coronary artery disease involving native coronary artery without angina pectoris ICD-10-CM: I25.10  ICD-9-CM: 414.01  12/20/2018    Overview Signed 12/20/2018  4:32 PM by Riaz Yun MD     2010 mcv, small heart attack with 4 stents to rca, lad  2013 mcv, abnormal stress test, stent to diagonal             Abnormal EKG ICD-10-CM: R94.31  ICD-9-CM: 794.31  12/20/2018        Essential hypertension ICD-10-CM: I10  ICD-9-CM: 401.9  12/20/2018        Mixed hyperlipidemia ICD-10-CM: E78.2  ICD-9-CM: 272.2  12/20/2018           Current Outpatient Medications on File Prior to Visit   Medication Sig    metoprolol tartrate (LOPRESSOR) 50 mg tablet TAKE 1 TABLET BY MOUTH EVERY DAY WITH MEALS    amLODIPine (NORVASC) 5 mg tablet TAKE 1 TABLET BY MOUTH EVERY DAY    Jardiance 25 mg tablet Take 25 mg by mouth daily. lisinopriL (PRINIVIL, ZESTRIL) 40 mg tablet TAKE 1 TABLET BY MOUTH EVERY DAY    clopidogreL (PLAVIX) 75 mg tab Take  by mouth. atorvastatin (LIPITOR) 20 mg tablet Take 40 mg by mouth nightly. glipiZIDE (GLUCOTROL) 10 mg tablet Take 10 mg by mouth two (2) times a day. metFORMIN (GLUCOPHAGE) 500 mg tablet Take 1,000 mg by mouth two (2) times daily (with meals). No current facility-administered medications on file prior to visit.        CARDIOLOGY STUDIES TO DATE:  2010 mcv, small heart attack with 4 stents to rca, lad  2013 mcv, abnormal stress test, stent to diagonal  1/19 echo lvef 43%  1/19 lexiscan cardiolyte with large fixed inferolateral defect, lvef 44%  12/19 event monitor, several episodes of VT, longest 14beats  08/28/20 echo lvef 45%, lae    Chief Complaint   Patient presents with    Follow-up     HPI :  He is doing great with no cardiac complaints or problems with his medications. He brought in some recent blood work from his primary care and everything looked good except his A1c was 7.5. He is active but not exercising and is actually lost a few pounds since we last met. In May he bought a 36 something ford hot regine which he has been enjoying  CARDIAC ROS:   negative for chest pain, dyspnea, palpitations, syncope, orthopnea, paroxysmal nocturnal dyspnea, exertional chest pressure/discomfort, claudication, lower extremity edema    Family History   Problem Relation Age of Onset    Heart Attack Father         fatal, age 64    Alzheimer's Disease Mother     Hypertension Mother        Past Medical History:   Diagnosis Date    At risk for sleep apnea 03/20/2019    COSME 6     CAD (coronary artery disease)     [facility]     Hyperlipidemia     Hypertension     MI (myocardial infarction) ([facility] 2010    [facility]        GENERAL ROS:  A comprehensive review of systems was negative except for that written in the HPI. Visit Vitals  /62   Pulse 73   Resp 16   Ht 6' (1.829 m)   Wt 216 lb (98 kg)   SpO2 95%   BMI 29.29 kg/m²       Wt Readings from Last 3 Encounters:   09/23/22 216 lb (98 kg)   03/23/22 221 lb 3.2 oz (100.3 kg)   09/22/21 224 lb (101.6 kg)            BP Readings from Last 3 Encounters:   09/23/22 128/62   03/23/22 132/68   09/22/21 120/80       PHYSICAL EXAM  General appearance: alert, cooperative, no distress, appears stated age  Neurologic: Alert and oriented X 3  Neck: supple, symmetrical, trachea midline, no adenopathy, no carotid bruit, and no JVD  Lungs: clear to auscultation bilaterally  Heart: regular rate and rhythm, S1, S2 normal, no murmur, click, rub or gallop  Extremities: extremities normal, atraumatic, no cyanosis or edema      ASSESSMENT :      He is stable and asymptomatic, well compensated on a good medical regimen and needs no cardiac testing at this time.   current treatment plan is effective, no change in therapy  lab results and schedule of future lab studies reviewed with patient  reviewed diet, exercise and weight control    Encounter Diagnoses   Name Primary? Coronary artery disease involving native coronary artery of native heart without angina pectoris Yes    Essential hypertension     Ischemic cardiomyopathy     Mixed hyperlipidemia      Orders Placed This Encounter    metoprolol tartrate (LOPRESSOR) 50 mg tablet       Follow-up and Dispositions    Return in about 6 months (around 3/23/2023). Aneta Tsang MD  9/23/2022  Please note that this dictation was completed with [employer], the computer voice recognition software. Quite often unanticipated grammatical, syntax, homophones, and other interpretive errors are inadvertently transcribed by the computer software. Please disregard these errors. Please excuse any errors that have escaped final proofreading. Thank you.

## 2022-09-23 NOTE — TELEPHONE ENCOUNTER
Patient is trying to find out clarification on what medication he should be taking per the doctor.     [phone]

## 2023-03-20 DIAGNOSIS — I10 ESSENTIAL HYPERTENSION: ICD-10-CM

## 2023-03-20 DIAGNOSIS — I25.10 CORONARY ARTERY DISEASE INVOLVING NATIVE CORONARY ARTERY OF NATIVE HEART WITHOUT ANGINA PECTORIS: ICD-10-CM

## 2023-03-20 DIAGNOSIS — E78.2 MIXED HYPERLIPIDEMIA: ICD-10-CM

## 2023-03-20 DIAGNOSIS — I25.5 ISCHEMIC CARDIOMYOPATHY: ICD-10-CM

## 2023-03-20 RX ORDER — AMLODIPINE BESYLATE 5 MG/1
TABLET ORAL
Qty: 90 TABLET | Refills: 1 | Status: SHIPPED | OUTPATIENT
Start: 2023-03-20

## 2023-03-20 NOTE — TELEPHONE ENCOUNTER
Requested Prescriptions     Signed Prescriptions Disp Refills    amLODIPine (NORVASC) 5 mg tablet 90 Tablet 1     Sig: TAKE 1 TABLET BY MOUTH EVERY DAY     Authorizing Provider: Zee Mckeon     Ordering User: Romulo Weldon    Per Dr. Encinas Host verbal orders

## 2023-03-24 ENCOUNTER — OFFICE VISIT (OUTPATIENT)
Dept: CARDIOLOGY CLINIC | Age: 73
End: 2023-03-24

## 2023-03-24 VITALS
HEART RATE: 67 BPM | HEIGHT: 72 IN | BODY MASS INDEX: 30.48 KG/M2 | SYSTOLIC BLOOD PRESSURE: 136 MMHG | RESPIRATION RATE: 16 BRPM | DIASTOLIC BLOOD PRESSURE: 74 MMHG | OXYGEN SATURATION: 97 % | WEIGHT: 225 LBS

## 2023-03-24 DIAGNOSIS — E78.2 MIXED HYPERLIPIDEMIA: ICD-10-CM

## 2023-03-24 DIAGNOSIS — R94.31 ABNORMAL EKG: ICD-10-CM

## 2023-03-24 DIAGNOSIS — I25.10 CORONARY ARTERY DISEASE INVOLVING NATIVE CORONARY ARTERY OF NATIVE HEART WITHOUT ANGINA PECTORIS: Primary | ICD-10-CM

## 2023-03-24 DIAGNOSIS — I25.5 ISCHEMIC CARDIOMYOPATHY: ICD-10-CM

## 2023-03-24 DIAGNOSIS — I10 ESSENTIAL HYPERTENSION: ICD-10-CM

## 2023-03-24 NOTE — PROGRESS NOTES
HISTORY OF PRESENT ILLNESS  Nasir Clancy is a 81 y.o. male     SUMMARY:   Problem List  Date Reviewed: 3/24/2023            Codes Class Noted    Colon polyp ICD-10-CM: K63.5  ICD-9-CM: 211.3  4/22/2019        Coronary artery disease involving native coronary artery without angina pectoris ICD-10-CM: I25.10  ICD-9-CM: 414.01  12/20/2018    Overview Signed 12/20/2018  4:32 PM by Be Allen MD     2010 mcv, small heart attack with 4 stents to rca, lad  2013 mcv, abnormal stress test, stent to diagonal             Abnormal EKG ICD-10-CM: R94.31  ICD-9-CM: 794.31  12/20/2018        Essential hypertension ICD-10-CM: I10  ICD-9-CM: 401.9  12/20/2018        Mixed hyperlipidemia ICD-10-CM: E78.2  ICD-9-CM: 272.2  12/20/2018           Current Outpatient Medications on File Prior to Visit   Medication Sig    amLODIPine (NORVASC) 5 mg tablet TAKE 1 TABLET BY MOUTH EVERY DAY    metoprolol succinate (TOPROL-XL) 50 mg XL tablet TAKE 1 TABLET BY MOUTH EVERY DAY    Jardiance 25 mg tablet Take 25 mg by mouth daily. lisinopriL (PRINIVIL, ZESTRIL) 40 mg tablet TAKE 1 TABLET BY MOUTH EVERY DAY    clopidogreL (PLAVIX) 75 mg tab Take  by mouth. atorvastatin (LIPITOR) 20 mg tablet Take 40 mg by mouth nightly. glipiZIDE (GLUCOTROL) 10 mg tablet Take 10 mg by mouth two (2) times a day. metFORMIN (GLUCOPHAGE) 500 mg tablet Take 1,000 mg by mouth two (2) times daily (with meals). No current facility-administered medications on file prior to visit.        CARDIOLOGY STUDIES TO DATE:  2010 mcv, small heart attack with 4 stents to rca, lad  2013 mcv, abnormal stress test, stent to diagonal  1/19 echo lvef 43%  1/19 lexiscan cardiolyte with large fixed inferolateral defect, lvef 44%  12/19 event monitor, several episodes of VT, longest 14beats  08/28/20 echo lvef 45%, lae    Chief Complaint   Patient presents with    Follow-up     6 month follow up- patient denies cardiac symptoms     HPI :  He is doing great with no cardiac complaints or problems with his medications. He is active but not exercising and is picked up a little weight. Since his last A1c was just over 7 and his primary care is following his lipids. Blood pressure is well controlled. CARDIAC ROS:   negative for chest pain, dyspnea, palpitations, syncope, orthopnea, paroxysmal nocturnal dyspnea, exertional chest pressure/discomfort, claudication, lower extremity edema    Family History   Problem Relation Age of Onset    Heart Attack Father         fatal, age 64    Alzheimer's Disease Mother     Hypertension Mother        Past Medical History:   Diagnosis Date    At risk for sleep apnea 03/20/2019    COSME 6     CAD (coronary artery disease)     [facility]cere[facility] ([facility]     Hyperlipidemia     Hypertension     MI (myocardial infarction) ([facility] 2010    [facility]        GENERAL ROS:  A comprehensive review of systems was negative except for that written in the HPI.     Visit Vitals  /74 (BP 1 Location: Left upper arm, BP Patient Position: Sitting, BP Cuff Size: Large adult)   Pulse 67   Resp 16   Ht 6' (1.829 m)   Wt 225 lb (102.1 kg)   SpO2 97%   BMI 30.52 kg/m²       Wt Readings from Last 3 Encounters:   03/24/23 225 lb (102.1 kg)   09/23/22 216 lb (98 kg)   03/23/22 221 lb 3.2 oz (100.3 kg)            BP Readings from Last 3 Encounters:   03/24/23 136/74   09/23/22 128/62   03/23/22 132/68       PHYSICAL EXAM  General appearance: alert, cooperative, no distress, appears stated age  Neurologic: Alert and oriented X 3  Neck: supple, symmetrical, trachea midline, no adenopathy, no carotid bruit, and no JVD  Lungs: clear to auscultation bilaterally  Heart: regular rate and rhythm, S1, S2 normal, no murmur, click, rub or gallop  Extremities: extremities normal, atraumatic, no cyanosis or edema      ASSESSMENT :      He is stable and asymptomatic, well compensated on a good medical regimen and needs no cardiac testing at this time. current treatment plan is effective, no change in therapy  lab results and schedule of future lab studies reviewed with patient  reviewed diet, exercise and weight control    Encounter Diagnoses   Name Primary? Coronary artery disease involving native coronary artery of native heart without angina pectoris Yes    Abnormal EKG     Essential hypertension     Ischemic cardiomyopathy     Mixed hyperlipidemia      No orders of the defined types were placed in this encounter. Follow-up and Dispositions    Return in about 6 months (around 9/24/2023). Yair Amin MD  3/24/2023  Please note that this dictation was completed with [employer], the [employer] voice recognition software. Quite often unanticipated grammatical, syntax, homophones, and other interpretive errors are inadvertently transcribed by the computer software. Please disregard these errors. Please excuse any errors that have escaped final proofreading. Thank you.

## 2023-09-06 DIAGNOSIS — I10 ESSENTIAL (PRIMARY) HYPERTENSION: ICD-10-CM

## 2023-09-06 RX ORDER — METOPROLOL SUCCINATE 50 MG/1
TABLET, EXTENDED RELEASE ORAL
Qty: 90 TABLET | Refills: 2 | Status: SHIPPED | OUTPATIENT
Start: 2023-09-06

## 2023-09-06 NOTE — TELEPHONE ENCOUNTER
Requested Prescriptions     Signed Prescriptions Disp Refills    metoprolol succinate (TOPROL XL) 50 MG extended release tablet 90 tablet 2     Sig: TAKE 1 TABLET BY MOUTH EVERY DAY     Authorizing Provider: Shar Nunez     Ordering User: Lidia Granados    Per Dr. Piotr Kulkarni verbal order.

## 2023-09-11 DIAGNOSIS — I25.5 ISCHEMIC CARDIOMYOPATHY: ICD-10-CM

## 2023-09-11 DIAGNOSIS — I25.10 ATHEROSCLEROTIC HEART DISEASE OF NATIVE CORONARY ARTERY WITHOUT ANGINA PECTORIS: ICD-10-CM

## 2023-09-11 RX ORDER — AMLODIPINE BESYLATE 5 MG/1
TABLET ORAL
Qty: 90 TABLET | Refills: 1 | Status: SHIPPED | OUTPATIENT
Start: 2023-09-11

## 2023-09-11 NOTE — TELEPHONE ENCOUNTER
Requested Prescriptions     Signed Prescriptions Disp Refills    amLODIPine (NORVASC) 5 MG tablet 90 tablet 1     Sig: TAKE 1 TABLET BY MOUTH EVERY DAY     Authorizing Provider: Lakshmi Reese     Ordering User: Danielle Robles   Per Dr. Celis Payer verbal order.

## 2023-09-12 LAB — HBA1C MFR BLD HPLC: 6.8 %

## 2023-09-27 ENCOUNTER — OFFICE VISIT (OUTPATIENT)
Age: 73
End: 2023-09-27
Payer: MEDICARE

## 2023-09-27 VITALS
OXYGEN SATURATION: 99 % | HEART RATE: 67 BPM | DIASTOLIC BLOOD PRESSURE: 72 MMHG | BODY MASS INDEX: 31.4 KG/M2 | SYSTOLIC BLOOD PRESSURE: 138 MMHG | WEIGHT: 231.8 LBS | HEIGHT: 72 IN

## 2023-09-27 DIAGNOSIS — I25.10 CORONARY ARTERY DISEASE INVOLVING NATIVE CORONARY ARTERY OF NATIVE HEART WITHOUT ANGINA PECTORIS: Primary | ICD-10-CM

## 2023-09-27 DIAGNOSIS — E78.2 MIXED HYPERLIPIDEMIA: ICD-10-CM

## 2023-09-27 DIAGNOSIS — I10 ESSENTIAL HYPERTENSION: ICD-10-CM

## 2023-09-27 PROCEDURE — 3017F COLORECTAL CA SCREEN DOC REV: CPT | Performed by: SPECIALIST

## 2023-09-27 PROCEDURE — G8427 DOCREV CUR MEDS BY ELIG CLIN: HCPCS | Performed by: SPECIALIST

## 2023-09-27 PROCEDURE — 3075F SYST BP GE 130 - 139MM HG: CPT | Performed by: SPECIALIST

## 2023-09-27 PROCEDURE — 99214 OFFICE O/P EST MOD 30 MIN: CPT | Performed by: SPECIALIST

## 2023-09-27 PROCEDURE — 1123F ACP DISCUSS/DSCN MKR DOCD: CPT | Performed by: SPECIALIST

## 2023-09-27 PROCEDURE — 3078F DIAST BP <80 MM HG: CPT | Performed by: SPECIALIST

## 2023-09-27 PROCEDURE — G8417 CALC BMI ABV UP PARAM F/U: HCPCS | Performed by: SPECIALIST

## 2023-09-27 PROCEDURE — 1036F TOBACCO NON-USER: CPT | Performed by: SPECIALIST

## 2023-09-27 RX ORDER — PIOGLITAZONEHYDROCHLORIDE 45 MG/1
45 TABLET ORAL DAILY
COMMUNITY
Start: 2023-09-08

## 2023-09-27 NOTE — PROGRESS NOTES
HISTORY OF PRESENT ILLNESS  Marci Wilson is a 72 y.o. male     SUMMARY:   Patient Active Problem List   Diagnosis    Colon polyp    Essential hypertension    Coronary artery disease involving native coronary artery without angina pectoris    Abnormal EKG    Mixed hyperlipidemia            CARDIOLOGY STUDIES TO DATE:  2010 mcv, small heart attack with 4 stents to rca, lad  2013 mcv, abnormal stress test, stent to diagonal  1/19 echo lvef 43%  1/19 lexiscan cardiolyte with large fixed inferolateral defect, lvef 44%  12/19 event monitor, several episodes of VT, longest 14beats  08/28/20 echo lvef 45%, lae    Chief Complaint   Patient presents with    Coronary Artery Disease       HPI :  He is doing great with no cardiac complaints or problems with his medications. Since we last met he was started on Actos and he is gained a few pounds but no symptoms of heart failure. He is active but does not exercise. He brought in some blood work from earlier this month. His LDL was 52 and A1c was 6.8. Blood pressure is well controlled.     CARDIAC ROS:   negative for chest pain, claudication, dyspnea, irregular heart beat, lower extremity edema, orthopnea, paroxysmal nocturnal dyspnea, and syncope    Family History   Problem Relation Age of Onset    Heart Attack Father         fatal, age 64    Hypertension Mother     Alzheimer's Disease Mother        Past Medical History:   Diagnosis Date    At risk for sleep apnea 03/20/2019    ALLAN 6     CAD (coronary artery disease)     CVA (cerebral vascular accident) ([address])     Hyperlipidemia     Hypertension     MI (myocardial infarction) ([address]) 2010    Type 2 diabetes mellitus [facility])        Specialty Problems          Cardiology Problems    Coronary artery disease involving native coronary artery without angina pectoris        Essential hypertension        Mixed hyperlipidemia            GENERAL ROS:  A comprehensive review of systems was negative except for what was noted in

## 2024-01-16 DIAGNOSIS — I25.5 ISCHEMIC CARDIOMYOPATHY: ICD-10-CM

## 2024-01-16 DIAGNOSIS — I25.10 ATHEROSCLEROTIC HEART DISEASE OF NATIVE CORONARY ARTERY WITHOUT ANGINA PECTORIS: ICD-10-CM

## 2024-01-17 RX ORDER — AMLODIPINE BESYLATE 5 MG/1
TABLET ORAL
Qty: 90 TABLET | Refills: 3 | Status: SHIPPED | OUTPATIENT
Start: 2024-01-17

## 2024-01-17 NOTE — TELEPHONE ENCOUNTER
Requested Prescriptions     Signed Prescriptions Disp Refills    amLODIPine (NORVASC) 5 MG tablet 90 tablet 3     Sig: TAKE 1 TABLET BY MOUTH EVERY DAY     Authorizing Provider: NANCY JOHN III     Ordering User: MARIAM BAKER    Per Dr. John's verbal order.

## 2024-04-08 ENCOUNTER — OFFICE VISIT (OUTPATIENT)
Age: 74
End: 2024-04-08
Payer: MEDICARE

## 2024-04-08 VITALS
WEIGHT: 232.2 LBS | BODY MASS INDEX: 31.45 KG/M2 | SYSTOLIC BLOOD PRESSURE: 130 MMHG | DIASTOLIC BLOOD PRESSURE: 64 MMHG | HEIGHT: 72 IN | OXYGEN SATURATION: 99 % | HEART RATE: 82 BPM

## 2024-04-08 DIAGNOSIS — E78.2 MIXED HYPERLIPIDEMIA: ICD-10-CM

## 2024-04-08 DIAGNOSIS — I10 ESSENTIAL (PRIMARY) HYPERTENSION: ICD-10-CM

## 2024-04-08 DIAGNOSIS — R07.89 OTHER CHEST PAIN: ICD-10-CM

## 2024-04-08 DIAGNOSIS — I10 ESSENTIAL HYPERTENSION: ICD-10-CM

## 2024-04-08 DIAGNOSIS — I25.10 CORONARY ARTERY DISEASE INVOLVING NATIVE CORONARY ARTERY OF NATIVE HEART WITHOUT ANGINA PECTORIS: Primary | ICD-10-CM

## 2024-04-08 DIAGNOSIS — I25.5 ISCHEMIC CARDIOMYOPATHY: ICD-10-CM

## 2024-04-08 PROCEDURE — 3075F SYST BP GE 130 - 139MM HG: CPT | Performed by: SPECIALIST

## 2024-04-08 PROCEDURE — 1123F ACP DISCUSS/DSCN MKR DOCD: CPT | Performed by: SPECIALIST

## 2024-04-08 PROCEDURE — G8427 DOCREV CUR MEDS BY ELIG CLIN: HCPCS | Performed by: SPECIALIST

## 2024-04-08 PROCEDURE — G8417 CALC BMI ABV UP PARAM F/U: HCPCS | Performed by: SPECIALIST

## 2024-04-08 PROCEDURE — 1036F TOBACCO NON-USER: CPT | Performed by: SPECIALIST

## 2024-04-08 PROCEDURE — 3017F COLORECTAL CA SCREEN DOC REV: CPT | Performed by: SPECIALIST

## 2024-04-08 PROCEDURE — 99214 OFFICE O/P EST MOD 30 MIN: CPT | Performed by: SPECIALIST

## 2024-04-08 PROCEDURE — 3078F DIAST BP <80 MM HG: CPT | Performed by: SPECIALIST

## 2024-04-08 ASSESSMENT — PATIENT HEALTH QUESTIONNAIRE - PHQ9
SUM OF ALL RESPONSES TO PHQ QUESTIONS 1-9: 0
SUM OF ALL RESPONSES TO PHQ QUESTIONS 1-9: 0
SUM OF ALL RESPONSES TO PHQ9 QUESTIONS 1 & 2: 0
SUM OF ALL RESPONSES TO PHQ QUESTIONS 1-9: 0
SUM OF ALL RESPONSES TO PHQ QUESTIONS 1-9: 0
1. LITTLE INTEREST OR PLEASURE IN DOING THINGS: NOT AT ALL
2. FEELING DOWN, DEPRESSED OR HOPELESS: NOT AT ALL

## 2024-04-08 NOTE — PATIENT INSTRUCTIONS
Anais stress test instructions:  No caffeine products 24 hrs prior (ex: coffee, tea, soda, chocolate, migraine medications, etc.). Even \"decaf\" beverages  Nothing to eat or drink 4 hrs prior except medications with sips of water (unless advised to hold specific medication).  Medication instructions: okay to take medications, except hold diabetes medicines if you do not eat breakfast (see marked medications on AVS for meds to hold and meds okay to take)  No tobacco/nicotine products 12 hours prior.   Please call 24 hrs prior if appointment needs to be rescheduled.

## 2024-04-08 NOTE — PROGRESS NOTES
HISTORY OF PRESENT ILLNESS  Negro Thornton Jr. is a 73 y.o. male     SUMMARY:   Patient Active Problem List   Diagnosis    Colon polyp    Essential hypertension    Coronary artery disease involving native coronary artery without angina pectoris    Abnormal EKG    Mixed hyperlipidemia            CARDIOLOGY STUDIES TO DATE:  2010 mcv, small heart attack with 4 stents to rca, lad  2013 mcv, abnormal stress test, stent to diagonal  1/19 echo lvef 43%  1/19 lexiscan cardiolyte with large fixed inferolateral defect, lvef 44%  12/19 event monitor, several episodes of VT, longest 14beats  08/28/20 echo lvef 45%, lae    Chief Complaint   Patient presents with    Coronary Artery Disease     6 month follow up       HPI :  This winter he noticed that pretty consistently when he went on the cold air he was chest would get tight.  He has always had a little bit of shortness of breath with activity but this was something a little different.  Blood pressure is well-controlled.  He brought in some recent blood work from his primary care.  His hemoglobin A1c was 6.7 and his LDL cholesterol was 67.  He gets no regular exercise.  Weight is stable.    CARDIAC ROS:   negative for claudication, irregular heart beat, lower extremity edema, orthopnea, paroxysmal nocturnal dyspnea, and syncope    Family History   Problem Relation Age of Onset    Heart Attack Father         fatal, age 61    Hypertension Mother     Alzheimer's Disease Mother        Past Medical History:   Diagnosis Date    At risk for sleep apnea 03/20/2019    ALLAN 6     CAD (coronary artery disease)     CVA (cerebral vascular accident) ([facility])     Hyperlipidemia     Hypertension     MI (myocardial infarction) ([facility]) 2010    Type 2 diabetes mellitus ([facility])        Specialty Problems          Cardiology Problems    Coronary artery disease involving native coronary artery without angina pectoris        Essential hypertension        Mixed hyperlipidemia            GENERAL

## 2024-04-14 DIAGNOSIS — I10 ESSENTIAL (PRIMARY) HYPERTENSION: ICD-10-CM

## 2024-04-15 RX ORDER — METOPROLOL SUCCINATE 50 MG/1
TABLET, EXTENDED RELEASE ORAL
Qty: 90 TABLET | Refills: 3 | Status: SHIPPED | OUTPATIENT
Start: 2024-04-15

## 2024-04-15 NOTE — TELEPHONE ENCOUNTER
Requested Prescriptions     Signed Prescriptions Disp Refills    metoprolol succinate (TOPROL XL) 50 MG extended release tablet 90 tablet 3     Sig: TAKE 1 TABLET BY MOUTH EVERY DAY     Authorizing Provider: NANCY JOHN III     Ordering User: MARIAM BAKER    Per Dr. John's verbal order.

## 2024-04-22 ENCOUNTER — ANCILLARY PROCEDURE (OUTPATIENT)
Age: 74
End: 2024-04-22
Payer: MEDICARE

## 2024-04-22 VITALS
BODY MASS INDEX: 31.42 KG/M2 | SYSTOLIC BLOOD PRESSURE: 120 MMHG | WEIGHT: 232 LBS | HEART RATE: 71 BPM | DIASTOLIC BLOOD PRESSURE: 78 MMHG | HEIGHT: 72 IN

## 2024-04-22 DIAGNOSIS — I25.10 CORONARY ARTERY DISEASE INVOLVING NATIVE CORONARY ARTERY OF NATIVE HEART WITHOUT ANGINA PECTORIS: ICD-10-CM

## 2024-04-22 DIAGNOSIS — E78.2 MIXED HYPERLIPIDEMIA: ICD-10-CM

## 2024-04-22 DIAGNOSIS — R07.89 OTHER CHEST PAIN: ICD-10-CM

## 2024-04-22 DIAGNOSIS — I25.5 ISCHEMIC CARDIOMYOPATHY: ICD-10-CM

## 2024-04-22 PROCEDURE — A9500 TC99M SESTAMIBI: HCPCS | Performed by: SPECIALIST

## 2024-04-22 PROCEDURE — 78452 HT MUSCLE IMAGE SPECT MULT: CPT | Performed by: SPECIALIST

## 2024-04-22 RX ORDER — TETRAKIS(2-METHOXYISOBUTYLISOCYANIDE)COPPER(I) TETRAFLUOROBORATE 1 MG/ML
7.7 INJECTION, POWDER, LYOPHILIZED, FOR SOLUTION INTRAVENOUS
Status: COMPLETED | OUTPATIENT
Start: 2024-04-22 | End: 2024-04-22

## 2024-04-22 RX ORDER — TETRAKIS(2-METHOXYISOBUTYLISOCYANIDE)COPPER(I) TETRAFLUOROBORATE 1 MG/ML
26.3 INJECTION, POWDER, LYOPHILIZED, FOR SOLUTION INTRAVENOUS
Status: COMPLETED | OUTPATIENT
Start: 2024-04-22 | End: 2024-04-22

## 2024-04-22 RX ORDER — REGADENOSON 0.08 MG/ML
0.4 INJECTION, SOLUTION INTRAVENOUS
Status: COMPLETED | OUTPATIENT
Start: 2024-04-22 | End: 2024-04-22

## 2024-04-22 RX ADMIN — TECHNETIUM TC-99M SESTAMIBI 26.3 MILLICURIE: 1 INJECTION INTRAVENOUS at 13:55

## 2024-04-22 RX ADMIN — TECHNETIUM TC-99M SESTAMIBI 7.7 MILLICURIE: 1 INJECTION INTRAVENOUS at 13:05

## 2024-04-22 RX ADMIN — REGADENOSON 0.4 MG: 0.08 INJECTION, SOLUTION INTRAVENOUS at 13:55

## 2024-04-24 LAB
ECHO BSA: 2.31 M2
NUC STRESS EJECTION FRACTION: 34 %
STRESS BASELINE DIAS BP: 78 MMHG
STRESS BASELINE HR: 68 BPM
STRESS BASELINE SYS BP: 120 MMHG
STRESS ESTIMATED WORKLOAD: 1 METS
STRESS O2 SAT PEAK: 97 %
STRESS O2 SAT REST: 97 %
STRESS PEAK DIAS BP: 60 MMHG
STRESS PEAK SYS BP: 120 MMHG
STRESS PERCENT HR ACHIEVED: 60 %
STRESS POST PEAK HR: 90 BPM
STRESS RATE PRESSURE PRODUCT: NORMAL BPM*MMHG
STRESS ST DEPRESSION: 0 MM
STRESS TARGET HR: 149 BPM
TID: 1.13

## 2024-04-25 ENCOUNTER — TELEPHONE (OUTPATIENT)
Age: 74
End: 2024-04-25

## 2024-04-25 DIAGNOSIS — I25.5 ISCHEMIC CARDIOMYOPATHY: Primary | ICD-10-CM

## 2024-04-25 DIAGNOSIS — I25.10 CORONARY ARTERY DISEASE INVOLVING NATIVE CORONARY ARTERY OF NATIVE HEART WITHOUT ANGINA PECTORIS: ICD-10-CM

## 2024-04-25 NOTE — TELEPHONE ENCOUNTER
Pt called back. Verified patient's identity with two identifiers. Notified pt of stress test results and Dr. John's message. Scheduled echocardiogram. Patient verbalized understanding and denied further questions or concerns.     Future Appointments   Date Time Provider Department Center   4/29/2024  1:00 PM [facility]   10/18/2024 11:00 AM Fernando John III, MD [facility]

## 2024-04-25 NOTE — TELEPHONE ENCOUNTER
----- Message from Fernando John III, MD sent at 4/25/2024 11:26 AM EDT -----  Some areas of scar, but no evidence of active blockages. Heart muscle may have gotten a little weaker, needs echo to see if need to adjust meds

## 2024-04-29 ENCOUNTER — ANCILLARY PROCEDURE (OUTPATIENT)
Age: 74
End: 2024-04-29
Payer: MEDICARE

## 2024-04-29 VITALS
DIASTOLIC BLOOD PRESSURE: 78 MMHG | BODY MASS INDEX: 31.42 KG/M2 | WEIGHT: 232 LBS | SYSTOLIC BLOOD PRESSURE: 120 MMHG | HEIGHT: 72 IN

## 2024-04-29 DIAGNOSIS — I25.10 CORONARY ARTERY DISEASE INVOLVING NATIVE CORONARY ARTERY OF NATIVE HEART WITHOUT ANGINA PECTORIS: ICD-10-CM

## 2024-04-29 DIAGNOSIS — I25.5 ISCHEMIC CARDIOMYOPATHY: ICD-10-CM

## 2024-04-29 PROCEDURE — 93306 TTE W/DOPPLER COMPLETE: CPT | Performed by: SPECIALIST

## 2024-04-30 LAB
ECHO AO ASC DIAM: 4 CM
ECHO AO ASCENDING AORTA INDEX: 1.76 CM/M2
ECHO AO ROOT DIAM: 3.8 CM
ECHO AO ROOT INDEX: 1.67 CM/M2
ECHO AV AREA PEAK VELOCITY: 3 CM2
ECHO AV AREA VTI: 3.1 CM2
ECHO AV AREA/BSA PEAK VELOCITY: 1.3 CM2/M2
ECHO AV AREA/BSA VTI: 1.4 CM2/M2
ECHO AV MEAN GRADIENT: 6 MMHG
ECHO AV MEAN VELOCITY: 1.2 M/S
ECHO AV PEAK GRADIENT: 10 MMHG
ECHO AV PEAK VELOCITY: 1.6 M/S
ECHO AV VELOCITY RATIO: 0.56
ECHO AV VTI: 31.2 CM
ECHO BSA: 2.31 M2
ECHO EST RA PRESSURE: 3 MMHG
ECHO LA DIAMETER INDEX: 2.03 CM/M2
ECHO LA DIAMETER: 4.6 CM
ECHO LA TO AORTIC ROOT RATIO: 1.21
ECHO LA VOL A-L A2C: 71 ML (ref 18–58)
ECHO LA VOL A-L A4C: 79 ML (ref 18–58)
ECHO LA VOL BP: 84 ML (ref 18–58)
ECHO LA VOL MOD A2C: 67 ML (ref 18–58)
ECHO LA VOL MOD A4C: 77 ML (ref 18–58)
ECHO LA VOL/BSA BIPLANE: 37 ML/M2 (ref 16–34)
ECHO LA VOLUME AREA LENGTH: 88 ML
ECHO LA VOLUME INDEX A-L A2C: 31 ML/M2 (ref 16–34)
ECHO LA VOLUME INDEX A-L A4C: 35 ML/M2 (ref 16–34)
ECHO LA VOLUME INDEX AREA LENGTH: 39 ML/M2 (ref 16–34)
ECHO LA VOLUME INDEX MOD A2C: 30 ML/M2 (ref 16–34)
ECHO LA VOLUME INDEX MOD A4C: 34 ML/M2 (ref 16–34)
ECHO LV E' LATERAL VELOCITY: 9 CM/S
ECHO LV E' SEPTAL VELOCITY: 7 CM/S
ECHO LV EDV A2C: 123 ML
ECHO LV EDV A4C: 182 ML
ECHO LV EDV BP: 151 ML (ref 67–155)
ECHO LV EDV INDEX A4C: 80 ML/M2
ECHO LV EDV INDEX BP: 67 ML/M2
ECHO LV EDV NDEX A2C: 54 ML/M2
ECHO LV EJECTION FRACTION A2C: 24 %
ECHO LV EJECTION FRACTION A4C: 41 %
ECHO LV EJECTION FRACTION BIPLANE: 32 % (ref 55–100)
ECHO LV ESV A2C: 93 ML
ECHO LV ESV A4C: 107 ML
ECHO LV ESV BP: 103 ML (ref 22–58)
ECHO LV ESV INDEX A2C: 41 ML/M2
ECHO LV ESV INDEX A4C: 47 ML/M2
ECHO LV ESV INDEX BP: 45 ML/M2
ECHO LV FRACTIONAL SHORTENING: 30 % (ref 28–44)
ECHO LV INTERNAL DIMENSION DIASTOLE INDEX: 2.69 CM/M2
ECHO LV INTERNAL DIMENSION DIASTOLIC: 6.1 CM (ref 4.2–5.9)
ECHO LV INTERNAL DIMENSION SYSTOLIC INDEX: 1.89 CM/M2
ECHO LV INTERNAL DIMENSION SYSTOLIC: 4.3 CM
ECHO LV IVSD: 1.5 CM (ref 0.6–1)
ECHO LV MASS 2D: 438.7 G (ref 88–224)
ECHO LV MASS INDEX 2D: 193.3 G/M2 (ref 49–115)
ECHO LV POSTERIOR WALL DIASTOLIC: 1.5 CM (ref 0.6–1)
ECHO LV RELATIVE WALL THICKNESS RATIO: 0.49
ECHO LVOT AREA: 5.7 CM2
ECHO LVOT AV VTI INDEX: 0.54
ECHO LVOT DIAM: 2.7 CM
ECHO LVOT MEAN GRADIENT: 2 MMHG
ECHO LVOT PEAK GRADIENT: 3 MMHG
ECHO LVOT PEAK VELOCITY: 0.9 M/S
ECHO LVOT STROKE VOLUME INDEX: 42.9 ML/M2
ECHO LVOT SV: 97.3 ML
ECHO LVOT VTI: 17 CM
ECHO MV A VELOCITY: 1.03 M/S
ECHO MV E DECELERATION TIME (DT): 211.2 MS
ECHO MV E VELOCITY: 0.82 M/S
ECHO MV E/A RATIO: 0.8
ECHO MV E/E' LATERAL: 9.11
ECHO MV E/E' RATIO (AVERAGED): 10.41
ECHO RIGHT VENTRICULAR SYSTOLIC PRESSURE (RVSP): 35 MMHG
ECHO RV BASAL DIMENSION: 3.6 CM
ECHO RV FREE WALL PEAK S': 23 CM/S
ECHO RV TAPSE: 2.2 CM (ref 1.7–?)
ECHO TV REGURGITANT MAX VELOCITY: 2.82 M/S
ECHO TV REGURGITANT PEAK GRADIENT: 32 MMHG

## 2024-04-30 PROCEDURE — 93306 TTE W/DOPPLER COMPLETE: CPT | Performed by: SPECIALIST

## 2024-05-03 ENCOUNTER — TELEPHONE (OUTPATIENT)
Age: 74
End: 2024-05-03

## 2024-05-03 DIAGNOSIS — I25.5 ISCHEMIC CARDIOMYOPATHY: Primary | ICD-10-CM

## 2024-05-03 RX ORDER — SACUBITRIL AND VALSARTAN 49; 51 MG/1; MG/1
1 TABLET, FILM COATED ORAL 2 TIMES DAILY
Qty: 60 TABLET | Refills: 2 | Status: SHIPPED | OUTPATIENT
Start: 2024-05-03

## 2024-05-03 RX ORDER — SACUBITRIL AND VALSARTAN 49; 51 MG/1; MG/1
1 TABLET, FILM COATED ORAL 2 TIMES DAILY
COMMUNITY
End: 2024-05-03 | Stop reason: SDUPTHER

## 2024-05-03 NOTE — TELEPHONE ENCOUNTER
Echo confirms, heart has gotten a little weaker. I want him to stop lisinopril for 3 days and start middle dose entresto.     Lets see him in the office in about 3wks

## 2024-05-03 NOTE — TELEPHONE ENCOUNTER
Called pt. Verified patient's identity with two identifiers. Notified pt of results and message. Pt agreed to stop lisinopril and start entresto Tuesday. Explained free 30 day trial and told him I will include codes on his rx, but advised he get them on the website also. Scheduled 3 wk f/u. Patient verbalized understanding and denied further questions or concerns.     Requested Prescriptions     Signed Prescriptions Disp Refills    sacubitril-valsartan (ENTRESTO) 49-51 MG per tablet 60 tablet 2     Sig: Take 1 tablet by mouth 2 times daily Stop lisinopril for 3 days then start entresto 49-51 mg twice daily.     Authorizing Provider: NANCY JOHN III     Ordering User: MARIAM BAKER    Per Dr. John's verbal order.     Future Appointments   Date Time Provider Department Center   5/23/2024  2:40 PM Nancy John III, MD [facility]   10/18/2024 11:00 AM Nancy John III, MD [facility]

## 2024-05-23 ENCOUNTER — OFFICE VISIT (OUTPATIENT)
Age: 74
End: 2024-05-23
Payer: MEDICARE

## 2024-05-23 VITALS
RESPIRATION RATE: 20 BRPM | HEIGHT: 72 IN | WEIGHT: 235.2 LBS | SYSTOLIC BLOOD PRESSURE: 130 MMHG | BODY MASS INDEX: 31.86 KG/M2 | OXYGEN SATURATION: 96 % | HEART RATE: 74 BPM | DIASTOLIC BLOOD PRESSURE: 72 MMHG

## 2024-05-23 DIAGNOSIS — I25.5 ISCHEMIC CARDIOMYOPATHY: Primary | ICD-10-CM

## 2024-05-23 DIAGNOSIS — I25.10 CORONARY ARTERY DISEASE INVOLVING NATIVE CORONARY ARTERY OF NATIVE HEART WITHOUT ANGINA PECTORIS: ICD-10-CM

## 2024-05-23 DIAGNOSIS — E78.2 MIXED HYPERLIPIDEMIA: ICD-10-CM

## 2024-05-23 DIAGNOSIS — I10 ESSENTIAL (PRIMARY) HYPERTENSION: ICD-10-CM

## 2024-05-23 PROCEDURE — G8417 CALC BMI ABV UP PARAM F/U: HCPCS | Performed by: SPECIALIST

## 2024-05-23 PROCEDURE — 3078F DIAST BP <80 MM HG: CPT | Performed by: SPECIALIST

## 2024-05-23 PROCEDURE — 99214 OFFICE O/P EST MOD 30 MIN: CPT | Performed by: SPECIALIST

## 2024-05-23 PROCEDURE — G8427 DOCREV CUR MEDS BY ELIG CLIN: HCPCS | Performed by: SPECIALIST

## 2024-05-23 PROCEDURE — 3017F COLORECTAL CA SCREEN DOC REV: CPT | Performed by: SPECIALIST

## 2024-05-23 PROCEDURE — 1036F TOBACCO NON-USER: CPT | Performed by: SPECIALIST

## 2024-05-23 PROCEDURE — 1123F ACP DISCUSS/DSCN MKR DOCD: CPT | Performed by: SPECIALIST

## 2024-05-23 PROCEDURE — 3075F SYST BP GE 130 - 139MM HG: CPT | Performed by: SPECIALIST

## 2024-05-23 ASSESSMENT — PATIENT HEALTH QUESTIONNAIRE - PHQ9
2. FEELING DOWN, DEPRESSED OR HOPELESS: NOT AT ALL
SUM OF ALL RESPONSES TO PHQ QUESTIONS 1-9: 0
1. LITTLE INTEREST OR PLEASURE IN DOING THINGS: NOT AT ALL
SUM OF ALL RESPONSES TO PHQ9 QUESTIONS 1 & 2: 0
SUM OF ALL RESPONSES TO PHQ QUESTIONS 1-9: 0

## 2024-05-23 NOTE — PROGRESS NOTES
Chief Complaint   Patient presents with    Coronary Artery Disease    Follow-up     Follow up after echo and med change     /72 (Site: Left Upper Arm, Position: Sitting, Cuff Size: Medium Adult)   Pulse 74   Resp 20   Ht 1.829 m (6')   Wt 106.7 kg (235 lb 3.2 oz)   SpO2 96%   BMI 31.90 kg/m²

## 2024-05-23 NOTE — PROGRESS NOTES
HISTORY OF PRESENT ILLNESS  Negro Thornton Jr. is a 73 y.o. male     SUMMARY:   Patient Active Problem List   Diagnosis    Colon polyp    Essential hypertension    Coronary artery disease involving native coronary artery without angina pectoris    Abnormal EKG    Mixed hyperlipidemia            CARDIOLOGY STUDIES TO DATE:  2010 mcv, small heart attack with 4 stents to rca, lad  2013 mcv, abnormal stress test, stent to diagonal  1/19 echo lvef 43%  1/19 lexiscan cardiolyte with large fixed inferolateral defect, lvef 44%  12/19 event monitor, several episodes of VT, longest 14beats  08/28/20 echo lvef 45%, lae    4/24 lexiscan fixed defects  4/24 echo lve with lvef 30-35%, apical akinesis, mild mr, lae, ascending aorta 4cm    Chief Complaint   Patient presents with    Coronary Artery Disease    Follow-up     Follow up after echo and med change       HPI :  When we last met he was complaining of some shortness of breath so we did a stress test and echo and it appears that he has an ischemic cardiomyopathy with his ejection fraction dropping from 45 to about 30 to 35%, but no active ischemia.  We made some medication changes which she is tolerated without difficulty.  He still has some shortness of breath but no signs of volume overload and things are no worse than they have been.  Blood pressure is well-controlled and reviewed his lipid profile last month and it looked excellent.  He is with his daughter today who is a nurse practitioner at [employer]    CARDIAC ROS:   negative for chest pain, claudication, irregular heart beat, lower extremity edema, orthopnea, paroxysmal nocturnal dyspnea, and syncope    Family History   Problem Relation Age of Onset    Heart Attack Father         fatal, age 61    Hypertension Mother     Alzheimer's Disease Mother        Past Medical History:   Diagnosis Date    At risk for sleep apnea 03/20/2019    ALLAN 6     CAD (coronary artery disease)     CVA (cerebral vascular

## 2024-08-09 ENCOUNTER — ANCILLARY PROCEDURE (OUTPATIENT)
Age: 74
End: 2024-08-09
Payer: MEDICARE

## 2024-08-09 ENCOUNTER — OFFICE VISIT (OUTPATIENT)
Age: 74
End: 2024-08-09
Payer: MEDICARE

## 2024-08-09 VITALS
BODY MASS INDEX: 31.29 KG/M2 | HEART RATE: 72 BPM | DIASTOLIC BLOOD PRESSURE: 62 MMHG | OXYGEN SATURATION: 98 % | SYSTOLIC BLOOD PRESSURE: 114 MMHG | WEIGHT: 231 LBS | HEIGHT: 72 IN

## 2024-08-09 VITALS
HEART RATE: 74 BPM | DIASTOLIC BLOOD PRESSURE: 70 MMHG | WEIGHT: 231 LBS | SYSTOLIC BLOOD PRESSURE: 140 MMHG | BODY MASS INDEX: 31.29 KG/M2 | HEIGHT: 72 IN

## 2024-08-09 DIAGNOSIS — I25.5 ISCHEMIC CARDIOMYOPATHY: ICD-10-CM

## 2024-08-09 DIAGNOSIS — I10 ESSENTIAL (PRIMARY) HYPERTENSION: ICD-10-CM

## 2024-08-09 DIAGNOSIS — I25.10 CORONARY ARTERY DISEASE INVOLVING NATIVE CORONARY ARTERY OF NATIVE HEART WITHOUT ANGINA PECTORIS: ICD-10-CM

## 2024-08-09 DIAGNOSIS — I25.5 ISCHEMIC CARDIOMYOPATHY: Primary | ICD-10-CM

## 2024-08-09 DIAGNOSIS — E78.2 MIXED HYPERLIPIDEMIA: ICD-10-CM

## 2024-08-09 LAB
ECHO AO ASC DIAM: 3.8 CM
ECHO AO ASCENDING AORTA INDEX: 1.68 CM/M2
ECHO AO ROOT DIAM: 3.7 CM
ECHO AO ROOT INDEX: 1.64 CM/M2
ECHO AV AREA PEAK VELOCITY: 2.5 CM2
ECHO AV AREA VTI: 2.5 CM2
ECHO AV AREA/BSA PEAK VELOCITY: 1.1 CM2/M2
ECHO AV AREA/BSA VTI: 1.1 CM2/M2
ECHO AV MEAN GRADIENT: 4 MMHG
ECHO AV MEAN VELOCITY: 1 M/S
ECHO AV PEAK GRADIENT: 8 MMHG
ECHO AV PEAK VELOCITY: 1.4 M/S
ECHO AV VELOCITY RATIO: 0.57
ECHO AV VTI: 27.8 CM
ECHO BSA: 2.31 M2
ECHO EST RA PRESSURE: 8 MMHG
ECHO LA DIAMETER INDEX: 1.9 CM/M2
ECHO LA DIAMETER: 4.3 CM
ECHO LA TO AORTIC ROOT RATIO: 1.16
ECHO LA VOL A-L A2C: 63 ML (ref 18–58)
ECHO LA VOL A-L A4C: 61 ML (ref 18–58)
ECHO LA VOL BP: 64 ML (ref 18–58)
ECHO LA VOL MOD A2C: 61 ML (ref 18–58)
ECHO LA VOL MOD A4C: 58 ML (ref 18–58)
ECHO LA VOL/BSA BIPLANE: 28 ML/M2 (ref 16–34)
ECHO LA VOLUME AREA LENGTH: 67 ML
ECHO LA VOLUME INDEX A-L A2C: 28 ML/M2 (ref 16–34)
ECHO LA VOLUME INDEX A-L A4C: 27 ML/M2 (ref 16–34)
ECHO LA VOLUME INDEX AREA LENGTH: 30 ML/M2 (ref 16–34)
ECHO LA VOLUME INDEX MOD A2C: 27 ML/M2 (ref 16–34)
ECHO LA VOLUME INDEX MOD A4C: 26 ML/M2 (ref 16–34)
ECHO LV E' LATERAL VELOCITY: 9 CM/S
ECHO LV E' SEPTAL VELOCITY: 7 CM/S
ECHO LV EDV A2C: 158 ML
ECHO LV EDV A4C: 165 ML
ECHO LV EDV BP: 167 ML (ref 67–155)
ECHO LV EDV INDEX A4C: 73 ML/M2
ECHO LV EDV INDEX BP: 74 ML/M2
ECHO LV EDV NDEX A2C: 70 ML/M2
ECHO LV EJECTION FRACTION A2C: 31 %
ECHO LV EJECTION FRACTION A4C: 25 %
ECHO LV EJECTION FRACTION BIPLANE: 27 % (ref 55–100)
ECHO LV ESV A2C: 109 ML
ECHO LV ESV A4C: 124 ML
ECHO LV ESV BP: 121 ML (ref 22–58)
ECHO LV ESV INDEX A2C: 48 ML/M2
ECHO LV ESV INDEX A4C: 55 ML/M2
ECHO LV ESV INDEX BP: 54 ML/M2
ECHO LV FRACTIONAL SHORTENING: 21 % (ref 28–44)
ECHO LV INTERNAL DIMENSION DIASTOLE INDEX: 3.36 CM/M2
ECHO LV INTERNAL DIMENSION DIASTOLIC: 7.6 CM (ref 4.2–5.9)
ECHO LV INTERNAL DIMENSION SYSTOLIC INDEX: 2.65 CM/M2
ECHO LV INTERNAL DIMENSION SYSTOLIC: 6 CM
ECHO LV IVSD: 1 CM (ref 0.6–1)
ECHO LV MASS 2D: 371.5 G (ref 88–224)
ECHO LV MASS INDEX 2D: 164.4 G/M2 (ref 49–115)
ECHO LV POSTERIOR WALL DIASTOLIC: 1 CM (ref 0.6–1)
ECHO LV RELATIVE WALL THICKNESS RATIO: 0.26
ECHO LVOT AREA: 4.5 CM2
ECHO LVOT AV VTI INDEX: 0.58
ECHO LVOT DIAM: 2.4 CM
ECHO LVOT MEAN GRADIENT: 2 MMHG
ECHO LVOT PEAK GRADIENT: 3 MMHG
ECHO LVOT PEAK VELOCITY: 0.8 M/S
ECHO LVOT STROKE VOLUME INDEX: 32 ML/M2
ECHO LVOT SV: 72.3 ML
ECHO LVOT VTI: 16 CM
ECHO MV A VELOCITY: 0.8 M/S
ECHO MV E DECELERATION TIME (DT): 241.7 MS
ECHO MV E VELOCITY: 0.6 M/S
ECHO MV E/A RATIO: 0.75
ECHO MV E/E' LATERAL: 6.67
ECHO MV E/E' RATIO (AVERAGED): 7.62
ECHO MV E/E' SEPTAL: 8.57
ECHO PV MAX VELOCITY: 1.1 M/S
ECHO PV PEAK GRADIENT: 5 MMHG
ECHO RIGHT VENTRICULAR SYSTOLIC PRESSURE (RVSP): 32 MMHG
ECHO RV BASAL DIMENSION: 3.8 CM
ECHO RV TAPSE: 2.2 CM (ref 1.7–?)
ECHO RVOT PEAK GRADIENT: 1 MMHG
ECHO RVOT PEAK VELOCITY: 0.6 M/S
ECHO TV REGURGITANT MAX VELOCITY: 2.45 M/S
ECHO TV REGURGITANT PEAK GRADIENT: 24 MMHG

## 2024-08-09 PROCEDURE — G8427 DOCREV CUR MEDS BY ELIG CLIN: HCPCS | Performed by: SPECIALIST

## 2024-08-09 PROCEDURE — 1036F TOBACCO NON-USER: CPT | Performed by: SPECIALIST

## 2024-08-09 PROCEDURE — 93306 TTE W/DOPPLER COMPLETE: CPT | Performed by: SPECIALIST

## 2024-08-09 PROCEDURE — 3078F DIAST BP <80 MM HG: CPT | Performed by: SPECIALIST

## 2024-08-09 PROCEDURE — 99214 OFFICE O/P EST MOD 30 MIN: CPT | Performed by: SPECIALIST

## 2024-08-09 PROCEDURE — G8417 CALC BMI ABV UP PARAM F/U: HCPCS | Performed by: SPECIALIST

## 2024-08-09 PROCEDURE — 1123F ACP DISCUSS/DSCN MKR DOCD: CPT | Performed by: SPECIALIST

## 2024-08-09 PROCEDURE — 3074F SYST BP LT 130 MM HG: CPT | Performed by: SPECIALIST

## 2024-08-09 PROCEDURE — 3017F COLORECTAL CA SCREEN DOC REV: CPT | Performed by: SPECIALIST

## 2024-08-09 RX ORDER — ATORVASTATIN CALCIUM 40 MG/1
40 TABLET, FILM COATED ORAL DAILY
COMMUNITY
Start: 2024-05-28

## 2024-08-09 ASSESSMENT — PATIENT HEALTH QUESTIONNAIRE - PHQ9
2. FEELING DOWN, DEPRESSED OR HOPELESS: NOT AT ALL
SUM OF ALL RESPONSES TO PHQ9 QUESTIONS 1 & 2: 0
SUM OF ALL RESPONSES TO PHQ QUESTIONS 1-9: 0
1. LITTLE INTEREST OR PLEASURE IN DOING THINGS: NOT AT ALL

## 2024-08-09 NOTE — PROGRESS NOTES
HISTORY OF PRESENT ILLNESS  Negro Thornton Jr. is a 74 y.o. male     SUMMARY:   Patient Active Problem List   Diagnosis    Colon polyp    Essential hypertension    Coronary artery disease involving native coronary artery without angina pectoris    Abnormal EKG    Mixed hyperlipidemia            CARDIOLOGY STUDIES TO DATE:  2010 mcv, small heart attack with 4 stents to rca, lad  2013 mcv, abnormal stress test, stent to diagonal  1/19 echo lvef 43%  1/19 lexiscan cardiolyte with large fixed inferolateral defect, lvef 44%  12/19 event monitor, several episodes of VT, longest 14beats  08/28/20 echo lvef 45%, lae    4/24 lexiscan fixed defects  4/24 echo lve with lvef 30-35%, apical akinesis, mild mr, lae, ascending aorta 4cm    Chief Complaint   Patient presents with    Follow-up    Results     Same day echob    Cardiomyopathy       HPI :  He has tolerated his medication changes without any difficulty.  No current cardiac type symptoms and blood pressure is well-controlled.  His primary care is following his lipids and his diabetes.  He is somewhat active but not getting any regular exercise other than cutting grass.  His echo today showed that his ejection fractions up to almost 40% which is close to where it was back in 2019, 2020.    CARDIAC ROS:   negative for chest pain, claudication, dyspnea, irregular heart beat, lower extremity edema, orthopnea, paroxysmal nocturnal dyspnea, and syncope    Family History   Problem Relation Age of Onset    Heart Attack Father         fatal, age 61    Hypertension Mother     Alzheimer's Disease Mother        Past Medical History:   Diagnosis Date    At risk for sleep apnea 03/20/2019    ALLAN 6     CAD (coronary artery disease)     CVA (cerebral vascular accident) ([facility])     Hyperlipidemia     Hypertension     MI (myocardial infarction) ([facility]) 2010    Type 2 diabetes mellitus ([facility])        Specialty Problems          Cardiology Problems    Coronary artery disease involving

## 2024-09-20 DIAGNOSIS — I25.10 CORONARY ARTERY DISEASE INVOLVING NATIVE CORONARY ARTERY OF NATIVE HEART WITHOUT ANGINA PECTORIS: ICD-10-CM

## 2024-09-20 DIAGNOSIS — I10 ESSENTIAL (PRIMARY) HYPERTENSION: ICD-10-CM

## 2024-09-20 RX ORDER — SACUBITRIL AND VALSARTAN 97; 103 MG/1; MG/1
1 TABLET, FILM COATED ORAL 2 TIMES DAILY
Qty: 60 TABLET | Refills: 3 | Status: SHIPPED | OUTPATIENT
Start: 2024-09-20

## 2024-12-24 ENCOUNTER — TELEPHONE (OUTPATIENT)
Age: 74
End: 2024-12-24

## 2024-12-24 NOTE — TELEPHONE ENCOUNTER
Per dr. Mitesh Thornton   Patient of yours, Marley SANCHES  is his DIL at the hospital and a good friend  Says he has worsened in last few weeks with some edema and fatigue  Asking about labs etc  Maybe see if he can be seen  She was just worried about him when she saw him recently  Apparently he does not take his weights daily.     Spoke with patient, verified with 2 identifiers. Patient requested to be seen early due to severe fatigue and tiredness.   Appointment given for January. Patient voiced understanding.

## 2025-01-14 ENCOUNTER — OFFICE VISIT (OUTPATIENT)
Age: 75
End: 2025-01-14
Payer: MEDICARE

## 2025-01-14 VITALS
SYSTOLIC BLOOD PRESSURE: 100 MMHG | DIASTOLIC BLOOD PRESSURE: 70 MMHG | HEART RATE: 67 BPM | WEIGHT: 243.2 LBS | BODY MASS INDEX: 32.94 KG/M2 | HEIGHT: 72 IN | OXYGEN SATURATION: 93 %

## 2025-01-14 DIAGNOSIS — I25.10 CORONARY ARTERY DISEASE INVOLVING NATIVE CORONARY ARTERY OF NATIVE HEART WITHOUT ANGINA PECTORIS: Primary | ICD-10-CM

## 2025-01-14 DIAGNOSIS — E78.2 MIXED HYPERLIPIDEMIA: ICD-10-CM

## 2025-01-14 DIAGNOSIS — R06.02 SOB (SHORTNESS OF BREATH): ICD-10-CM

## 2025-01-14 DIAGNOSIS — I10 ESSENTIAL (PRIMARY) HYPERTENSION: ICD-10-CM

## 2025-01-14 DIAGNOSIS — R29.818 SUSPECTED SLEEP APNEA: ICD-10-CM

## 2025-01-14 DIAGNOSIS — I25.5 ISCHEMIC CARDIOMYOPATHY: ICD-10-CM

## 2025-01-14 PROCEDURE — 1036F TOBACCO NON-USER: CPT | Performed by: SPECIALIST

## 2025-01-14 PROCEDURE — 1160F RVW MEDS BY RX/DR IN RCRD: CPT | Performed by: SPECIALIST

## 2025-01-14 PROCEDURE — 3074F SYST BP LT 130 MM HG: CPT | Performed by: SPECIALIST

## 2025-01-14 PROCEDURE — 1126F AMNT PAIN NOTED NONE PRSNT: CPT | Performed by: SPECIALIST

## 2025-01-14 PROCEDURE — 3017F COLORECTAL CA SCREEN DOC REV: CPT | Performed by: SPECIALIST

## 2025-01-14 PROCEDURE — G8417 CALC BMI ABV UP PARAM F/U: HCPCS | Performed by: SPECIALIST

## 2025-01-14 PROCEDURE — 3078F DIAST BP <80 MM HG: CPT | Performed by: SPECIALIST

## 2025-01-14 PROCEDURE — 99214 OFFICE O/P EST MOD 30 MIN: CPT | Performed by: SPECIALIST

## 2025-01-14 PROCEDURE — 1159F MED LIST DOCD IN RCRD: CPT | Performed by: SPECIALIST

## 2025-01-14 PROCEDURE — 1123F ACP DISCUSS/DSCN MKR DOCD: CPT | Performed by: SPECIALIST

## 2025-01-14 PROCEDURE — G8427 DOCREV CUR MEDS BY ELIG CLIN: HCPCS | Performed by: SPECIALIST

## 2025-01-14 RX ORDER — FUROSEMIDE 40 MG/1
40 TABLET ORAL DAILY
Qty: 90 TABLET | Refills: 3 | Status: SHIPPED | OUTPATIENT
Start: 2025-01-14

## 2025-01-14 ASSESSMENT — PATIENT HEALTH QUESTIONNAIRE - PHQ9
SUM OF ALL RESPONSES TO PHQ QUESTIONS 1-9: 0
2. FEELING DOWN, DEPRESSED OR HOPELESS: NOT AT ALL
SUM OF ALL RESPONSES TO PHQ QUESTIONS 1-9: 0
SUM OF ALL RESPONSES TO PHQ9 QUESTIONS 1 & 2: 0
1. LITTLE INTEREST OR PLEASURE IN DOING THINGS: NOT AT ALL

## 2025-01-14 NOTE — PATIENT INSTRUCTIONS
Start Lasix 40mg daily - every morning.     Reduce Entresto to 49/51mg twice daily.    Labs in 7-10 days - repeat BMP.    We will order an overnight oxymetry test.

## 2025-01-31 DIAGNOSIS — I25.10 CORONARY ARTERY DISEASE INVOLVING NATIVE CORONARY ARTERY OF NATIVE HEART WITHOUT ANGINA PECTORIS: ICD-10-CM

## 2025-01-31 DIAGNOSIS — I25.5 ISCHEMIC CARDIOMYOPATHY: ICD-10-CM

## 2025-02-01 LAB
ANION GAP SERPL CALC-SCNC: 6 MMOL/L (ref 2–12)
BUN SERPL-MCNC: 18 MG/DL (ref 6–20)
BUN/CREAT SERPL: 17 (ref 12–20)
CALCIUM SERPL-MCNC: 9.1 MG/DL (ref 8.5–10.1)
CHLORIDE SERPL-SCNC: 105 MMOL/L (ref 97–108)
CO2 SERPL-SCNC: 27 MMOL/L (ref 21–32)
CREAT SERPL-MCNC: 1.04 MG/DL (ref 0.7–1.3)
GLUCOSE SERPL-MCNC: 197 MG/DL (ref 65–100)
POTASSIUM SERPL-SCNC: 4.3 MMOL/L (ref 3.5–5.1)
SODIUM SERPL-SCNC: 138 MMOL/L (ref 136–145)

## 2025-02-03 NOTE — RESULT ENCOUNTER NOTE
Spoke with patient after 2 identifiers to review test results/recommendations.States he has not checked his BP since he was here at the office,however I do see a recent BP from 01/14/2025 100/70. He does have a BP machine at home but does not use it-encouraged to check his BP and send us a few recent readings. Weight on 01/14/2025 was 243 lbs and states his weight on 01/31/2025 was 240 lbs. He states he still has some SOBOE but stated overall he thinks he feels a little better.  Pt verbalized understanding and reminded to call with any questions or concerns..

## 2025-02-19 ENCOUNTER — TELEPHONE (OUTPATIENT)
Age: 75
End: 2025-02-19

## 2025-02-19 NOTE — TELEPHONE ENCOUNTER
Overnight oximetry results came back    9hrs study duration  39min less than 88% O2 saturation  Lowest sat 81%    He qualifies for oxygen at night.  Needs referral for sleep evaluationl    May need to talk to daughter who is a nurse practioner at [employer] for results and to try and talk him into testing. Oxygen by itself may not be effective if he doesn't have cpap

## 2025-02-20 DIAGNOSIS — R29.818 SUSPECTED SLEEP APNEA: Primary | ICD-10-CM

## 2025-02-20 NOTE — TELEPHONE ENCOUNTER
Attempted to reach patient by telephone. A message was left for return call.      Referral sent for Sleep eval to Dr. Snell with note that I LM at pt number as I do not see the daughter's name on basic information. LM for pt or wife to call this nurse with daughter's name and number(she is an NP at [employer]) as she may need to talk pt into getting testing.

## 2025-02-27 ENCOUNTER — TELEPHONE (OUTPATIENT)
Age: 75
End: 2025-02-27

## 2025-02-27 NOTE — TELEPHONE ENCOUNTER
Daughter returning my call regarding her father Negro Thornton. Relayed results of Oximetry study and she did state they got a call that Oxygen would be delivered soon.  Added her as an Emergency contact.  Wondered if he needs a follow up appt-spoke with Dr. John who stated pt can make appt to see us after his sleep study is completed.  Relayed info to daughter who stated he has agreed to meet with Sleep specialist but has not completely agreed to sleep study.  She agreed to follow up with us with what is decided.

## 2025-05-17 DIAGNOSIS — I10 ESSENTIAL (PRIMARY) HYPERTENSION: ICD-10-CM

## 2025-05-19 RX ORDER — METOPROLOL SUCCINATE 50 MG/1
50 TABLET, EXTENDED RELEASE ORAL DAILY
Qty: 90 TABLET | Refills: 3 | Status: SHIPPED | OUTPATIENT
Start: 2025-05-19

## 2025-05-19 NOTE — TELEPHONE ENCOUNTER
Requested Prescriptions     Signed Prescriptions Disp Refills    metoprolol succinate (TOPROL XL) 50 MG extended release tablet 90 tablet 3     Sig: TAKE 1 TABLET BY MOUTH EVERY DAY     Authorizing Provider: NANCY MENEZES III     Ordering User: CARLOS ETIENNE      No future appointments.   Per Verbal Order by MD/NP

## (undated) DEVICE — ENDO CARRY-ON PROCEDURE KIT INCLUDES ENZYMATIC SPONGE, GAUZE, BIOHAZARD LABEL, TRAY, LUBRICANT, DIRTY SCOPE LABEL, WATER LABEL, TRAY, DRAWSTRING PAD, AND DEFENDO 4-PIECE KIT.: Brand: ENDO CARRY-ON PROCEDURE KIT

## (undated) DEVICE — 1200 GUARD II KIT W/5MM TUBE W/O VAC TUBE: Brand: GUARDIAN

## (undated) DEVICE — NON-REM POLYHESIVE PATIENT RETURN ELECTRODE: Brand: VALLEYLAB

## (undated) DEVICE — APPLICATOR BNDG 1MM ADH PREMIERPRO EXOFIN

## (undated) DEVICE — SOLUTION LACTATED RINGERS INJECTION USP

## (undated) DEVICE — SEAL UNIV 5-8MM DISP BX/10 -- DA VINCI XI - SNGL USE

## (undated) DEVICE — INFECTION CONTROL KIT SYS

## (undated) DEVICE — SUTURE PDS II SZ 1 L27IN ABSRB VLT CT-1 L36MM 1/2 CIR Z341H

## (undated) DEVICE — FENESTRATED BIPOLAR FORCEPS: Brand: ENDOWRIST

## (undated) DEVICE — CATH IV AUTOGRD BC BLU 22GA 25 -- INSYTE

## (undated) DEVICE — SUTURE VLOC 90 2/0 VL 6 GS-22 VLOCM2105

## (undated) DEVICE — HANDLE LT SNAP ON ULT DURABLE LENS FOR TRUMPF ALC DISPOSABLE

## (undated) DEVICE — AIRLIFE™ U/CONNECT-IT OXYGEN TUBING 7 FEET (2.1 M) CRUSH-RESISTANT OXYGEN TUBING, VINYL TIPPED: Brand: AIRLIFE™

## (undated) DEVICE — KENDALL DL ECG CABLE AND LEAD WIRE SYSTEM, 3-LEAD, SINGLE PATIENT USE: Brand: KENDALL

## (undated) DEVICE — CONNECTOR TBNG AUX H2O JET DISP FOR OLY 160/180 SER

## (undated) DEVICE — SOLIDIFIER FLUID 3000 CC ABSORB

## (undated) DEVICE — REDUCER CANN ENDOWRIST 12-8MM -- DA VINCI XI - SNGL USE

## (undated) DEVICE — SUT SLK 2-0SH 30IN BLK --

## (undated) DEVICE — (D)AGENT INJECTN ELEVIEW 10ML -- DISC BY MFG

## (undated) DEVICE — STERILE POLYISOPRENE POWDER-FREE SURGICAL GLOVES WITH EMOLLIENT COATING: Brand: PROTEXIS

## (undated) DEVICE — NEEDLE HYPO 25GA L1.5IN BVL ORIENTED ECLIPSE

## (undated) DEVICE — SYR 10ML LUER LOK 1/5ML GRAD --

## (undated) DEVICE — Device

## (undated) DEVICE — SNARE ENDOSCP M L240CM W27MM SHTH DIA2.4MM CHN 2.8MM OVL

## (undated) DEVICE — BAG BELONG PT PERS CLEAR HANDL

## (undated) DEVICE — QUILTED PREMIUM COMFORT UNDERPAD,EXTRA HEAVY: Brand: WINGS

## (undated) DEVICE — SOLUTION IV 1000ML 0.9% SOD CHL

## (undated) DEVICE — SYRINGE MED 20ML STD CLR PLAS LUERLOCK TIP N CTRL DISP

## (undated) DEVICE — TRAP SPEC COLL POLYP POLYSTYR --

## (undated) DEVICE — REM POLYHESIVE ADULT PATIENT RETURN ELECTRODE: Brand: VALLEYLAB

## (undated) DEVICE — Z DISCONTINUED USE 2751540 TUBING IRRIG L10IN DISP PMP ENDOGATOR

## (undated) DEVICE — SNARE POLYP SM AD W13MMXL240CM SHTH DIA2.4MM HEX STIFF

## (undated) DEVICE — LARGE SUTURE CUT NEEDLE DRIVER: Brand: ENDOWRIST

## (undated) DEVICE — SEAL

## (undated) DEVICE — STAPLER 60: Brand: SUREFORM

## (undated) DEVICE — APPLICATOR FLEXIBLE 360D 40CM --

## (undated) DEVICE — BAG SPEC BIOHZD LF 2MIL 6X10IN -- CONVERT TO ITEM 357326

## (undated) DEVICE — STAPLER 60 RELOAD WHITE: Brand: SUREFORM

## (undated) DEVICE — CONTINU-FLO SOLUTION SET, 2 INJECTION SITES, MALE LUER LOCK ADAPTER WITH RETRACTABLE COLLAR, LARGE BORE STOPCOCK WITH ROTATING MALE LUER LOCK EXTENSION SET, 2 INJECTION SITES, MALE LUER LOCK ADAPTER WITH RETRACTABLE COLLAR: Brand: INTERLINK/CONTINU-FLO

## (undated) DEVICE — TRAP SURG QUAD PARABOLA SLOT DSGN SFTY SCRN TRAPEASE

## (undated) DEVICE — ELECTRO LUBE IS A SINGLE PATIENT USE DEVICE THAT IS INTENDED TO BE USED ON ELECTROSURGICAL ELECTRODES TO REDUCE STICKING.: Brand: KEY SURGICAL ELECTRO LUBE

## (undated) DEVICE — BW-412T DISP COMBO CLEANING BRUSH: Brand: SINGLE USE COMBINATION CLEANING BRUSH

## (undated) DEVICE — (D)PREP SKN CHLRAPRP APPL 26ML -- CONVERT TO ITEM 371833

## (undated) DEVICE — STAPLER SHEATH: Brand: ENDOWRIST

## (undated) DEVICE — SEALANT FIBRIN 10 CC FRZN PRE FILLED SYR TISSEEL

## (undated) DEVICE — SUTURE MCRYL SZ 4-0 L27IN ABSRB UD L19MM PS-2 1/2 CIR PRIM Y426H

## (undated) DEVICE — NEEDLE HYPO 18GA L1.5IN PNK S STL HUB POLYPR SHLD REG BVL

## (undated) DEVICE — SOLIDIFIER MEDC 1200ML -- CONVERT TO 356117

## (undated) DEVICE — CONTAINER SPEC 20 ML LID NEUT BUFF FORMALIN 10 % POLYPR STS

## (undated) DEVICE — PACK,BASIC,SIRUS,V: Brand: MEDLINE

## (undated) DEVICE — SYRINGE 50ML E/T

## (undated) DEVICE — TRI-LUMEN FILTERED TUBE SET WITH ACTIVATED CHARCOAL FILTER: Brand: AIRSEAL

## (undated) DEVICE — BAG SPEC BIOHZRD 10 X 10 IN --

## (undated) DEVICE — Device: Brand: MEDICAL ACTION INDUSTRIES

## (undated) DEVICE — ACCESS PLATFORM FOR MINIMALLY INVASIVE SURGERY: Brand: GELPOINT®  MINI ADVANCED ACCESS PLATFORM

## (undated) DEVICE — KENDALL RADIOLUCENT FOAM MONITORING ELECTRODE -RECTANGULAR SHAPE: Brand: KENDALL

## (undated) DEVICE — SEALANT FIBRIN 4 CC FRZN PRE FILLED SYR TISSEEL

## (undated) DEVICE — COLON CLOSING PACK: Brand: MEDLINE INDUSTRIES, INC.

## (undated) DEVICE — NEEDLE SCLERO 23GA L4MM CATH L240CM CNTRST SHTH DIA1.8MM

## (undated) DEVICE — TRAY CATH W/ 16FR CATH URIN M CTRL FIT OUTLT TB F STATLOK

## (undated) DEVICE — SET ADMIN 16ML TBNG L100IN 2 Y INJ SITE IV PIGGY BK DISP

## (undated) DEVICE — VESSEL SEALER: Brand: ENDOWRIST

## (undated) DEVICE — AIRSEAL 8 MM ACCESS PORT AND LOW PROFILE OBTURATOR WITH BLADELESS OPTICAL TIP, 120 MM LENGTH: Brand: AIRSEAL

## (undated) DEVICE — 3M™ TEGADERM™ TRANSPARENT FILM DRESSING FRAME STYLE, 1626W, 4 IN X 4-3/4 IN (10 CM X 12 CM), 50/CT 4CT/CASE: Brand: 3M™ TEGADERM™

## (undated) DEVICE — SURGICAL PROCEDURE KIT GEN LAPAROSCOPY LF

## (undated) DEVICE — TOWEL SURG W17XL27IN STD BLU COT NONFENESTRATED PREWASHED

## (undated) DEVICE — Z DISCONTINUED NO SUB IDED SET EXTN W/ 4 W STPCOCK M SPIN LOK 36IN

## (undated) DEVICE — STAPLER RELOAD SUREFORM 60 BLU -- DA VINCI XI

## (undated) DEVICE — FCPS BX HOT RJ4 2.2MMX240CM -- RADIAL JAW 4 BX/40

## (undated) DEVICE — 3M™ TEGADERM™ TRANSPARENT FILM DRESSING FRAME STYLE, 1624W, 2-3/8 IN X 2-3/4 IN (6 CM X 7 CM), 100/CT 4CT/CASE: Brand: 3M™ TEGADERM™

## (undated) DEVICE — SUTURE VCRL SZ 3-0 L27IN ABSRB VLT L26MM SH 1/2 CIR J316H

## (undated) DEVICE — STERILE-Z MAYO STAND COVERS CLEAR POLYETHYLENE STERILE UNIVERSAL FIT 20 PER CASE: Brand: STERILE-Z

## (undated) DEVICE — ARM DRAPE

## (undated) DEVICE — VISUALIZATION SYSTEM: Brand: CLEARIFY

## (undated) DEVICE — BLADELESS OBTURATOR: Brand: WECK VISTA

## (undated) DEVICE — KENDALL SCD EXPRESS SLEEVES, KNEE LENGTH, MEDIUM: Brand: KENDALL SCD